# Patient Record
Sex: MALE | Race: WHITE | NOT HISPANIC OR LATINO | URBAN - METROPOLITAN AREA
[De-identification: names, ages, dates, MRNs, and addresses within clinical notes are randomized per-mention and may not be internally consistent; named-entity substitution may affect disease eponyms.]

---

## 2017-06-23 PROBLEM — Z00.00 ENCOUNTER FOR PREVENTIVE HEALTH EXAMINATION: Status: ACTIVE | Noted: 2017-06-23

## 2018-09-05 ENCOUNTER — EMERGENCY (EMERGENCY)
Facility: HOSPITAL | Age: 58
LOS: 1 days | Discharge: HOME | End: 2018-09-05
Attending: EMERGENCY MEDICINE
Payer: COMMERCIAL

## 2018-09-05 VITALS
OXYGEN SATURATION: 99 % | DIASTOLIC BLOOD PRESSURE: 65 MMHG | SYSTOLIC BLOOD PRESSURE: 110 MMHG | TEMPERATURE: 98 F | HEART RATE: 56 BPM | RESPIRATION RATE: 18 BRPM

## 2018-09-05 VITALS
WEIGHT: 164.91 LBS | HEIGHT: 66 IN | RESPIRATION RATE: 18 BRPM | HEART RATE: 74 BPM | DIASTOLIC BLOOD PRESSURE: 80 MMHG | SYSTOLIC BLOOD PRESSURE: 130 MMHG | TEMPERATURE: 98 F | OXYGEN SATURATION: 100 %

## 2018-09-05 LAB
ALBUMIN SERPL ELPH-MCNC: 3.9 G/DL — SIGNIFICANT CHANGE UP (ref 3.3–5.2)
ALP SERPL-CCNC: 83 U/L — SIGNIFICANT CHANGE UP (ref 40–120)
ALT FLD-CCNC: 63 U/L — HIGH
ANION GAP SERPL CALC-SCNC: 12 MMOL/L — SIGNIFICANT CHANGE UP (ref 5–17)
AST SERPL-CCNC: 89 U/L — HIGH
BASOPHILS # BLD AUTO: 0 K/UL — SIGNIFICANT CHANGE UP (ref 0–0.2)
BASOPHILS NFR BLD AUTO: 0.2 % — SIGNIFICANT CHANGE UP (ref 0–2)
BILIRUB SERPL-MCNC: 0.5 MG/DL — SIGNIFICANT CHANGE UP (ref 0.4–2)
BUN SERPL-MCNC: 19 MG/DL — SIGNIFICANT CHANGE UP (ref 8–20)
CALCIUM SERPL-MCNC: 9.2 MG/DL — SIGNIFICANT CHANGE UP (ref 8.6–10.2)
CHLORIDE SERPL-SCNC: 107 MMOL/L — SIGNIFICANT CHANGE UP (ref 98–107)
CK SERPL-CCNC: 124 U/L — SIGNIFICANT CHANGE UP (ref 30–200)
CO2 SERPL-SCNC: 25 MMOL/L — SIGNIFICANT CHANGE UP (ref 22–29)
CREAT SERPL-MCNC: 0.8 MG/DL — SIGNIFICANT CHANGE UP (ref 0.5–1.3)
EOSINOPHIL # BLD AUTO: 0.1 K/UL — SIGNIFICANT CHANGE UP (ref 0–0.5)
EOSINOPHIL NFR BLD AUTO: 1.5 % — SIGNIFICANT CHANGE UP (ref 0–5)
GLUCOSE SERPL-MCNC: 111 MG/DL — SIGNIFICANT CHANGE UP (ref 70–115)
HCT VFR BLD CALC: 37.8 % — LOW (ref 42–52)
HGB BLD-MCNC: 12.6 G/DL — LOW (ref 14–18)
LYMPHOCYTES # BLD AUTO: 1.2 K/UL — SIGNIFICANT CHANGE UP (ref 1–4.8)
LYMPHOCYTES # BLD AUTO: 24.2 % — SIGNIFICANT CHANGE UP (ref 20–55)
MCHC RBC-ENTMCNC: 30.1 PG — SIGNIFICANT CHANGE UP (ref 27–31)
MCHC RBC-ENTMCNC: 33.3 G/DL — SIGNIFICANT CHANGE UP (ref 32–36)
MCV RBC AUTO: 90.2 FL — SIGNIFICANT CHANGE UP (ref 80–94)
MONOCYTES # BLD AUTO: 0.5 K/UL — SIGNIFICANT CHANGE UP (ref 0–0.8)
MONOCYTES NFR BLD AUTO: 9.6 % — SIGNIFICANT CHANGE UP (ref 3–10)
NEUTROPHILS # BLD AUTO: 3.1 K/UL — SIGNIFICANT CHANGE UP (ref 1.8–8)
NEUTROPHILS NFR BLD AUTO: 64.3 % — SIGNIFICANT CHANGE UP (ref 37–73)
PLATELET # BLD AUTO: 161 K/UL — SIGNIFICANT CHANGE UP (ref 150–400)
POTASSIUM SERPL-MCNC: 4 MMOL/L — SIGNIFICANT CHANGE UP (ref 3.5–5.3)
POTASSIUM SERPL-SCNC: 4 MMOL/L — SIGNIFICANT CHANGE UP (ref 3.5–5.3)
PROT SERPL-MCNC: 6.7 G/DL — SIGNIFICANT CHANGE UP (ref 6.6–8.7)
RBC # BLD: 4.19 M/UL — LOW (ref 4.6–6.2)
RBC # FLD: 12.9 % — SIGNIFICANT CHANGE UP (ref 11–15.6)
SODIUM SERPL-SCNC: 144 MMOL/L — SIGNIFICANT CHANGE UP (ref 135–145)
TROPONIN T SERPL-MCNC: <0.01 NG/ML — SIGNIFICANT CHANGE UP (ref 0–0.06)
TROPONIN T SERPL-MCNC: <0.01 NG/ML — SIGNIFICANT CHANGE UP (ref 0–0.06)
WBC # BLD: 4.8 K/UL — SIGNIFICANT CHANGE UP (ref 4.8–10.8)
WBC # FLD AUTO: 4.8 K/UL — SIGNIFICANT CHANGE UP (ref 4.8–10.8)

## 2018-09-05 PROCEDURE — 93010 ELECTROCARDIOGRAM REPORT: CPT

## 2018-09-05 PROCEDURE — 80053 COMPREHEN METABOLIC PANEL: CPT

## 2018-09-05 PROCEDURE — 99283 EMERGENCY DEPT VISIT LOW MDM: CPT | Mod: 25

## 2018-09-05 PROCEDURE — 85027 COMPLETE CBC AUTOMATED: CPT

## 2018-09-05 PROCEDURE — 93005 ELECTROCARDIOGRAM TRACING: CPT

## 2018-09-05 PROCEDURE — 82550 ASSAY OF CK (CPK): CPT

## 2018-09-05 PROCEDURE — 71046 X-RAY EXAM CHEST 2 VIEWS: CPT | Mod: 26

## 2018-09-05 PROCEDURE — 84484 ASSAY OF TROPONIN QUANT: CPT

## 2018-09-05 PROCEDURE — 71046 X-RAY EXAM CHEST 2 VIEWS: CPT

## 2018-09-05 PROCEDURE — 36415 COLL VENOUS BLD VENIPUNCTURE: CPT

## 2018-09-05 PROCEDURE — 99285 EMERGENCY DEPT VISIT HI MDM: CPT | Mod: 25

## 2018-09-05 RX ORDER — ATORVASTATIN CALCIUM 80 MG/1
1 TABLET, FILM COATED ORAL
Qty: 0 | Refills: 0 | COMMUNITY

## 2018-09-05 RX ORDER — FAMOTIDINE 10 MG/ML
40 INJECTION INTRAVENOUS ONCE
Qty: 0 | Refills: 0 | Status: COMPLETED | OUTPATIENT
Start: 2018-09-05 | End: 2018-09-05

## 2018-09-05 RX ORDER — ASPIRIN/CALCIUM CARB/MAGNESIUM 324 MG
1 TABLET ORAL
Qty: 0 | Refills: 0 | COMMUNITY

## 2018-09-05 RX ORDER — SODIUM CHLORIDE 9 MG/ML
3 INJECTION INTRAMUSCULAR; INTRAVENOUS; SUBCUTANEOUS EVERY 8 HOURS
Qty: 0 | Refills: 0 | Status: DISCONTINUED | OUTPATIENT
Start: 2018-09-05 | End: 2018-09-10

## 2018-09-05 RX ADMIN — FAMOTIDINE 40 MILLIGRAM(S): 10 INJECTION INTRAVENOUS at 05:04

## 2018-09-05 NOTE — ED PROVIDER NOTE - ATTENDING CONTRIBUTION TO CARE
59 yo M with hx of high cholesterol presents to ED c/o being awakened from sleep by mid-sternal pressure with assoc belching.  No assoc SOB, N/v, diaphoresis or syncope.  Pt seen by Cardio in the past and has had neg stress tests.  On arrival pt denies any pain.  On exam pt awake and alert in NAD.  HEENT  throat clear mm moist, Neck supple, Cor Reg, Lungs clear b/l, Abd soft, NT, BS+, Ext no edema, Neuro non-focal.  EKG NSR with no acute changes, Will check labs, CE, CXR and re-eval

## 2018-09-05 NOTE — ED PROVIDER NOTE - PROGRESS NOTE DETAILS
Uday: evaluated pt. he is currently chest pain free. low risk heart score. However, I advised the pt that we should proceed with CTA cardiac to rule out cardiac etiology. He states he does not want it as he wishes rto go to work, and states his cardiologist Dr. sanchez perfomred a CT in September of last eyar with a "normal calcium score and normal vessels". He states he feels well, already has a f/u appt with cardio within the next week and declines Re-evalauted pt. Again he is well appearing and chest pain free. Second trop pending. A second time I advised and urged the pt to proceed with cardiac CT and he states "I have had so many scans I am going to light up". States he just wants to be discharged after second set of enzymes and f/u with his cardiologist. I informed him that it is still possible to have interval development of CAD and he is still at risk for MI and sudden death but she states he does not wish to do so and again wishes to leavbe. Second trop neg. pt remains chest pain free and wishes to f/u with laura. Wife is now here, and I offered and recommended CTA cardiac, she agrees that she would like him to be discharged and f/u with cardiology. Low risk heart score of 3, and as such, prudent for expedient cardio f/u. Will dc and not sign out ama

## 2018-09-05 NOTE — ED ADULT NURSE REASSESSMENT NOTE - NS ED NURSE REASSESS COMMENT FT1
patient aware of plan to repeat blood work about 8 am
Assumed pt care at this time. Pt A & OX4, respirations even & unlabored. Pt states he "feels better" pt waiting repeat Troponin dispo pending Trop results, pt aware of plan of care.

## 2018-09-05 NOTE — ED ADULT TRIAGE NOTE - CHIEF COMPLAINT QUOTE
c/o woke up for indigestion, ribs discomfort, denies any chest pain diaphoreses  sees a cardiologist every yr

## 2018-09-05 NOTE — ED ADULT NURSE NOTE - NSIMPLEMENTINTERV_GEN_ALL_ED
Implemented All Universal Safety Interventions:  [place] to call system. Call bell, personal items and telephone within reach. Instruct patient to call for assistance. Room bathroom lighting operational. Non-slip footwear when patient is off stretcher. Physically safe environment: no spills, clutter or unnecessary equipment. Stretcher in lowest position, wheels locked, appropriate side rails in place.

## 2018-09-05 NOTE — ED PROVIDER NOTE - OBJECTIVE STATEMENT
Pt is a 58y M with PMH of Hypercholesterolemia complaining of chest pain that woke him up from sleep Wife reports he was pacing due to the pain. Pt reports the pain as a pressure in "my whole chest cavity". HE reports having a lot of flatulence lately but has not changed his diet. He reports having normal BM's every day. He is followed by a cardiologist and has an appointment on 9/11. HE has a stress test yearly. He has a significant family cardiac history. He takes a baby aspirin daily. Pt reports pain subsided on arrival to ER. He denies any diaphoresis/discomfort/cough/fever/SOB/nausea/vomiting/pain. NKDA.  PCP is Dr. Rebolledo.

## 2018-09-05 NOTE — ED PROVIDER NOTE - CHPI ED SYMPTOMS NEG
no shortness of breath/no back pain/no cough/no fever/no chills/no dizziness/no nausea/no vomiting/no syncope/no diaphoresis

## 2019-02-28 PROBLEM — E78.00 PURE HYPERCHOLESTEROLEMIA, UNSPECIFIED: Chronic | Status: ACTIVE | Noted: 2018-09-05

## 2019-03-18 ENCOUNTER — RECORD ABSTRACTING (OUTPATIENT)
Age: 59
End: 2019-03-18

## 2019-03-18 DIAGNOSIS — Z82.3 FAMILY HISTORY OF STROKE: ICD-10-CM

## 2019-03-18 DIAGNOSIS — Z78.9 OTHER SPECIFIED HEALTH STATUS: ICD-10-CM

## 2019-03-18 DIAGNOSIS — K21.9 GASTRO-ESOPHAGEAL REFLUX DISEASE W/OUT ESOPHAGITIS: ICD-10-CM

## 2019-03-18 DIAGNOSIS — Z83.3 FAMILY HISTORY OF DIABETES MELLITUS: ICD-10-CM

## 2019-05-30 ENCOUNTER — APPOINTMENT (OUTPATIENT)
Dept: GASTROENTEROLOGY | Facility: CLINIC | Age: 59
End: 2019-05-30
Payer: COMMERCIAL

## 2019-05-30 VITALS
HEIGHT: 67 IN | BODY MASS INDEX: 26.53 KG/M2 | DIASTOLIC BLOOD PRESSURE: 70 MMHG | SYSTOLIC BLOOD PRESSURE: 110 MMHG | HEART RATE: 74 BPM | WEIGHT: 169 LBS

## 2019-05-30 PROCEDURE — 99203 OFFICE O/P NEW LOW 30 MIN: CPT

## 2019-05-30 RX ORDER — ASPIRIN 81 MG
81 TABLET, DELAYED RELEASE (ENTERIC COATED) ORAL
Refills: 0 | Status: DISCONTINUED | COMMUNITY

## 2019-05-31 NOTE — ASSESSMENT
[FreeTextEntry1] : Positive family history of 2 first-degree relatives, brothers with colon polyps, therefore, patient is at increased risk for development of colorectal neoplasia. As such a screening colonoscopy is indicated and was offered to the patient. The procedure, its risks, benefits, and prepped, were explained to the patient, who understands and is agreeable to proceed. ASA #2. Suprep will be utilized. Aspirin will be held for one week prior to the procedure. Recent blood work from PCP will be reviewed. No additional blood work is anticipated. Patient is in optimal medical condition to undergo planned procedure. Results to follow. Arrangements made.

## 2019-05-31 NOTE — HISTORY OF PRESENT ILLNESS
[FreeTextEntry1] : 58-year-old white male with history of hyperlipidemia spinal stenosis kidney stones, who has had a negative screening colonoscopy in 1/2010 by .\par Patient now reports that his family history is remarkable for 2 brothers who have had colon polyps. A maternal grandfather had stomach cancer, but no family members with colon cancer. Patient presents for evaluation of a screening colonoscopy. He reports having a daily bowel movement. No abdominal pain, weight loss, alteration in pattern of bowel movements or rectal bleeding. No history of hemorrhoids. No anemia. Recent blood work was performed by Dr. Rodríguez, primary care physician. Currently, not available. Will be reviewed.\par On the upper GI symptom is occasional heartburn, responsive to use of TUMS. No new medical issues. No history of cardiac or pulmonary disease.

## 2019-05-31 NOTE — PHYSICAL EXAM
[General Appearance - Alert] : alert [General Appearance - In No Acute Distress] : in no acute distress [Sclera] : the sclera and conjunctiva were normal [PERRL With Normal Accommodation] : pupils were equal in size, round, and reactive to light [Extraocular Movements] : extraocular movements were intact [Outer Ear] : the ears and nose were normal in appearance [Oropharynx] : the oropharynx was normal [Neck Appearance] : the appearance of the neck was normal [Neck Cervical Mass (___cm)] : no neck mass was observed [Jugular Venous Distention Increased] : there was no jugular-venous distention [Thyroid Diffuse Enlargement] : the thyroid was not enlarged [Thyroid Nodule] : there were no palpable thyroid nodules [Auscultation Breath Sounds / Voice Sounds] : lungs were clear to auscultation bilaterally [Heart Rate And Rhythm] : heart rate was normal and rhythm regular [Heart Sounds] : normal S1 and S2 [Heart Sounds Gallop] : no gallops [Murmurs] : no murmurs [Heart Sounds Pericardial Friction Rub] : no pericardial rub [Bowel Sounds] : normal bowel sounds [Abdomen Soft] : soft [Abdomen Tenderness] : non-tender [] : no hepato-splenomegaly [Abdomen Mass (___ Cm)] : no abdominal mass palpated [FreeTextEntry1] : no scars. No adenopathy. No hernias are

## 2019-05-31 NOTE — CONSULT LETTER
[Dear  ___] : Dear  [unfilled], [Consult Letter:] : I had the pleasure of evaluating your patient, [unfilled]. [Please see my note below.] : Please see my note below. [Consult Closing:] : Thank you very much for allowing me to participate in the care of this patient.  If you have any questions, please do not hesitate to contact me. [Sincerely,] : Sincerely, [FreeTextEntry1] : Prior negative colonoscopy 9.5 years ago. That was positive family history of 2 first-degree relatives with colon polyps, brothers. Screening colonoscopy arranged. [FreeTextEntry3] : Mike Arthur MD FACG\par Diplomate American Board of Internal Medicine and Gastroenterolgy\par [facility]\par

## 2019-06-03 ENCOUNTER — RECORD ABSTRACTING (OUTPATIENT)
Age: 59
End: 2019-06-03

## 2019-06-03 ENCOUNTER — APPOINTMENT (OUTPATIENT)
Dept: CARDIOLOGY | Facility: CLINIC | Age: 59
End: 2019-06-03
Payer: COMMERCIAL

## 2019-06-03 ENCOUNTER — NON-APPOINTMENT (OUTPATIENT)
Age: 59
End: 2019-06-03

## 2019-06-03 VITALS
HEIGHT: 67 IN | DIASTOLIC BLOOD PRESSURE: 75 MMHG | BODY MASS INDEX: 26.68 KG/M2 | RESPIRATION RATE: 16 BRPM | HEART RATE: 60 BPM | OXYGEN SATURATION: 97 % | WEIGHT: 170 LBS | SYSTOLIC BLOOD PRESSURE: 120 MMHG

## 2019-06-03 DIAGNOSIS — Z87.442 PERSONAL HISTORY OF URINARY CALCULI: ICD-10-CM

## 2019-06-03 DIAGNOSIS — Z87.39 PERSONAL HISTORY OF OTHER DISEASES OF THE MUSCULOSKELETAL SYSTEM AND CONNECTIVE TISSUE: ICD-10-CM

## 2019-06-03 DIAGNOSIS — Z82.49 FAMILY HISTORY OF ISCHEMIC HEART DISEASE AND OTHER DISEASES OF THE CIRCULATORY SYSTEM: ICD-10-CM

## 2019-06-03 DIAGNOSIS — R55 SYNCOPE AND COLLAPSE: ICD-10-CM

## 2019-06-03 DIAGNOSIS — Z80.52 FAMILY HISTORY OF MALIGNANT NEOPLASM OF BLADDER: ICD-10-CM

## 2019-06-03 DIAGNOSIS — Z86.39 PERSONAL HISTORY OF OTHER ENDOCRINE, NUTRITIONAL AND METABOLIC DISEASE: ICD-10-CM

## 2019-06-03 DIAGNOSIS — Z87.898 PERSONAL HISTORY OF OTHER SPECIFIED CONDITIONS: ICD-10-CM

## 2019-06-03 DIAGNOSIS — Z12.11 ENCOUNTER FOR SCREENING FOR MALIGNANT NEOPLASM OF COLON: ICD-10-CM

## 2019-06-03 PROCEDURE — 93000 ELECTROCARDIOGRAM COMPLETE: CPT

## 2019-06-03 PROCEDURE — 99204 OFFICE O/P NEW MOD 45 MIN: CPT

## 2019-06-03 RX ORDER — TADALAFIL 20 MG/1
20 TABLET, FILM COATED ORAL
Refills: 0 | Status: ACTIVE | COMMUNITY

## 2019-06-03 RX ORDER — CHROMIUM 200 MCG
TABLET ORAL
Refills: 0 | Status: ACTIVE | COMMUNITY

## 2019-06-03 RX ORDER — ASPIRIN 81 MG
81 TABLET, DELAYED RELEASE (ENTERIC COATED) ORAL DAILY
Refills: 0 | Status: ACTIVE | COMMUNITY

## 2019-06-03 NOTE — HISTORY OF PRESENT ILLNESS
[FreeTextEntry1] : He is active but does not exercise regularly.\par \par Patient denies any chest pain, shortness of breath, palpitations, PND, orthopnea or edema\par \par Follows a reasonably good diet and has no other active medical issues at this time.

## 2019-06-03 NOTE — REASON FOR VISIT
[FreeTextEntry1] : This is a 58 year old male presenting for cardiac evaluation and transferring care from Dr. Cristofer Berger who has now retired. His cardiac history includes;\par \par 1. CAD based on Abnl Ca++ scoring\par 2. High Cholesterol\par 3. History of chest pain \par 4. Extensive familial cardiac history of HTN, stent placement, CVA and CABG.\par \par He was seen and released  from [facility]  in 2018 for an acute new onset of chest  pain   and recommended to have a  Nuclear stress test which he never had completed.  Since his initial chest pain occurrence he since as been asymptomatic.

## 2019-06-03 NOTE — ASSESSMENT
[FreeTextEntry1] : ECG: Sinus bradycardia 50 beats a minute. Normal axis and intervals. No significant ST or T wave changes.\par \par Laboratory data 5/19/19:\par Cholesterol 137\par HDL 50\par LDL 72\par A1c 5.6\par Electrolytes and LFTs normal\par \par Impression:\par 58-year-old male with risk factors of hyperlipidemia and a strong family history of coronary artery disease with an abnormal calcium score.\par \par 1 hyperlipidemia seems adequately controlled.\par \par 2. Absence of a regular exercise program but no exercise related symptoms\par \par 3. Unexplained chest pain syndrome resulting in a hospital visit a few months ago and an uncompleted stress test.\par \par Plan:\par 1. Schedule regular exercise stress test.\par \par 2. Begin regular symptom limited exercise regimen\par \par 3. For all future blood work here for review\par \par 4. Followup in 6 months or p.r.n.

## 2019-07-19 ENCOUNTER — RX CHANGE (OUTPATIENT)
Age: 59
End: 2019-07-19

## 2019-07-19 ENCOUNTER — TRANSCRIPTION ENCOUNTER (OUTPATIENT)
Age: 59
End: 2019-07-19

## 2019-07-19 ENCOUNTER — APPOINTMENT (OUTPATIENT)
Dept: CARDIOLOGY | Facility: CLINIC | Age: 59
End: 2019-07-19
Payer: COMMERCIAL

## 2019-07-19 PROCEDURE — 93015 CV STRESS TEST SUPVJ I&R: CPT

## 2019-08-16 ENCOUNTER — APPOINTMENT (OUTPATIENT)
Dept: CARDIOLOGY | Facility: CLINIC | Age: 59
End: 2019-08-16
Payer: COMMERCIAL

## 2019-08-16 PROCEDURE — A9500: CPT

## 2019-08-16 PROCEDURE — 93015 CV STRESS TEST SUPVJ I&R: CPT

## 2019-08-16 PROCEDURE — 78452 HT MUSCLE IMAGE SPECT MULT: CPT

## 2019-09-05 RX ORDER — KIT FOR THE PREPARATION OF TECHNETIUM TC99M SESTAMIBI 1 MG/5ML
INJECTION, POWDER, LYOPHILIZED, FOR SOLUTION PARENTERAL
Refills: 0 | Status: COMPLETED | OUTPATIENT
Start: 2019-09-05

## 2019-09-05 RX ADMIN — KIT FOR THE PREPARATION OF TECHNETIUM TC99M SESTAMIBI 0: 1 INJECTION, POWDER, LYOPHILIZED, FOR SOLUTION PARENTERAL at 00:00

## 2019-09-23 ENCOUNTER — OUTPATIENT (OUTPATIENT)
Dept: OUTPATIENT SERVICES | Facility: HOSPITAL | Age: 59
LOS: 1 days | End: 2019-09-23
Payer: COMMERCIAL

## 2019-09-23 ENCOUNTER — APPOINTMENT (OUTPATIENT)
Dept: GASTROENTEROLOGY | Facility: GI CENTER | Age: 59
End: 2019-09-23
Payer: COMMERCIAL

## 2019-09-23 DIAGNOSIS — Z12.11 ENCOUNTER FOR SCREENING FOR MALIGNANT NEOPLASM OF COLON: ICD-10-CM

## 2019-09-23 PROCEDURE — 45378 DIAGNOSTIC COLONOSCOPY: CPT

## 2019-09-23 PROCEDURE — G0105: CPT

## 2019-09-23 NOTE — PROCEDURE
[Colon Cancer Screening] : colon cancer screening [Procedure Explained] : The procedure was explained [Allergies Reviewed] : allergies reviewed. [Risks] : Risks [Benefits] : benefits [Alternatives] : alternatives [Consent Obtained] : written consent was obtained prior to the procedure and is detailed in the patient's record [Patient] : the patient [Bowel Prep Kit] : the patient took the appropriate bowel preparation kit as directed [Approved Diet Followed] : the patient avoided solid foods and adhered to the approved diet list for 24 hours prior to the procedure [Automated Blood Pressure Cuff] : automated blood pressure cuff [Cardiac Monitor] : cardiac monitor [Pulse Oximeter] : pulse oximeter [Propofol ___ mg IV] : Propofol [unfilled] ~Umg intravenously [2] : 2 [Prep Qualtiy: ___] : Prep Quality:  [unfilled] [Withdrawal Time: ___] : Withdrawal Time:  [unfilled] [Left Lateral Decubitus] : The patient was positioned in the left lateral decubitus position [Abnormal Rectum] : a normal rectum [External Hemorrhoids] : no external hemorrhoids [Normal Prostate] : a normal prostate [Terminal Ileum via Ileocecal Valve] : and the terminal ileum was examined by entering the ileocecal valve [Minimal Difficulty] : with minimal difficulty [Insufflated] : insufflated [Multiple Passes Needed] : after multiple passes [Retroflex View] : a retroflex view of the rectum was performed [Normal] : Normal [Hemorrhoids] : hemorrhoids [Tolerated Well] : the patient tolerated the procedure well [Vital Signs Stable] : the vital signs were stable [No Complications] : There were no complications [de-identified] : [id number] [de-identified] : TI / ICV were normal.   [de-identified] : Moderate Grade 2 internal hemorrhoids; inactive.   [de-identified] : Negative for Neoplasia;  + Internal hemorrhoids.

## 2019-09-23 NOTE — ASSESSMENT
[FreeTextEntry1] : Screening colonoscopy is negative for CR neoplasia.  Avg risk; repeat screening colonoscopy in 10 yrs.  \par \par Internal hemorrhoids:  High fiber diet.  Topicals prn.

## 2019-09-23 NOTE — REASON FOR VISIT
[Follow-Up: _____] : a [unfilled] follow-up visit [Colonoscopy] : a colonoscopy [FreeTextEntry2] : screening

## 2019-09-23 NOTE — HISTORY OF PRESENT ILLNESS
[FreeTextEntry1] : 59 yr old WM c HLD.  neg FH for CRCa.  All prior BW was normal in 5/2019.  No new medical issues.

## 2019-09-23 NOTE — PROCEDURE
[Colon Cancer Screening] : colon cancer screening [Procedure Explained] : The procedure was explained [Allergies Reviewed] : allergies reviewed. [Risks] : Risks [Benefits] : benefits [Alternatives] : alternatives [Consent Obtained] : written consent was obtained prior to the procedure and is detailed in the patient's record [Patient] : the patient [Bowel Prep Kit] : the patient took the appropriate bowel preparation kit as directed [Approved Diet Followed] : the patient avoided solid foods and adhered to the approved diet list for 24 hours prior to the procedure [Automated Blood Pressure Cuff] : automated blood pressure cuff [Cardiac Monitor] : cardiac monitor [Pulse Oximeter] : pulse oximeter [Propofol ___ mg IV] : Propofol [unfilled] ~Umg intravenously [2] : 2 [Prep Qualtiy: ___] : Prep Quality:  [unfilled] [Withdrawal Time: ___] : Withdrawal Time:  [unfilled] [Left Lateral Decubitus] : The patient was positioned in the left lateral decubitus position [Abnormal Rectum] : a normal rectum [External Hemorrhoids] : no external hemorrhoids [Normal Prostate] : a normal prostate [Terminal Ileum via Ileocecal Valve] : and the terminal ileum was examined by entering the ileocecal valve [Minimal Difficulty] : with minimal difficulty [Insufflated] : insufflated [Multiple Passes Needed] : after multiple passes [Retroflex View] : a retroflex view of the rectum was performed [Normal] : Normal [Hemorrhoids] : hemorrhoids [Tolerated Well] : the patient tolerated the procedure well [Vital Signs Stable] : the vital signs were stable [No Complications] : There were no complications [de-identified] : [id number] [de-identified] : TI / ICV were normal.   [de-identified] : Negative for Neoplasia;  + Internal hemorrhoids.   [de-identified] : Moderate Grade 2 internal hemorrhoids; inactive.

## 2019-10-28 ENCOUNTER — NON-APPOINTMENT (OUTPATIENT)
Age: 59
End: 2019-10-28

## 2019-10-28 ENCOUNTER — APPOINTMENT (OUTPATIENT)
Dept: CARDIOLOGY | Facility: CLINIC | Age: 59
End: 2019-10-28
Payer: COMMERCIAL

## 2019-10-28 VITALS
BODY MASS INDEX: 26.53 KG/M2 | WEIGHT: 169 LBS | SYSTOLIC BLOOD PRESSURE: 110 MMHG | DIASTOLIC BLOOD PRESSURE: 72 MMHG | RESPIRATION RATE: 16 BRPM | HEART RATE: 64 BPM | OXYGEN SATURATION: 99 % | HEIGHT: 67 IN

## 2019-10-28 DIAGNOSIS — Z12.11 ENCOUNTER FOR SCREENING FOR MALIGNANT NEOPLASM OF COLON: ICD-10-CM

## 2019-10-28 PROCEDURE — 99214 OFFICE O/P EST MOD 30 MIN: CPT

## 2019-10-28 PROCEDURE — 93000 ELECTROCARDIOGRAM COMPLETE: CPT

## 2019-10-28 NOTE — HISTORY OF PRESENT ILLNESS
[FreeTextEntry1] : He continues to be physically active but does not exercise regularly.\par \par Patient denies any chest pain, shortness of breath, palpitations, PND, orthopnea or edema\par \par Follows a reasonably good diet and has no other active medical issues at this time.

## 2019-10-28 NOTE — ASSESSMENT
[FreeTextEntry1] : ECG: Sinus rhythm  64  beats a minute, DRWP.\par \par 7/19/19 EST\par Exercised for 12 minutes 0 seconds achieving 13 METS\par Peak  bpm ( 94 %)\par Peak /80\par 1.0 mm horizontal ST segment depression, positive for ischemia\par \par \par 8/16/19 Nuclear stress test\par Peak  BPM\par Peak BP   166/76\par Dev. rare PVCs and PACs\par 2.0 mm horizontal segment depression\par SPECT imaging negative for ischemia\par \par \par Laboratory data 5/19/19:\par Chol  137\par HDL   50\par LDL   72\par A1c 5.6\par Electrolytes and LFTs normal\par \par Impression:\par 59-year-old male with risk factors of hyperlipidemia and a strong family history of coronary artery disease with an abnormal calcium score.\par \par 1 Hyperlipidemia seems adequately controlled and tolerating Lipitor 40 mg.\par \par 2. Absence of a regular exercise program, walks daily with no exertional discomfort and good exercise tolerance\par \par 3 .Nuclear stress test SPECT imaging negative for ischemia with likely false + Stress ECG\par \par 4. Blood pressure today 110/72 and controlled.\par \par Mr. Sorto has been extensively educated on the all results of of his cardiac testing and reassured at this time in the absence of any cardiac associated symptoms, excellent exercise tolerance and that the abnormal  findings on his Stress ECG are likely a false positive.\par \par Plan:\par 1. Moving forward future stress test will be ordered as a nuclear study. This can be repeated in 2 years in the absence of any cardiac associated symptoms.\par \par 2. Increase exercise regimen and continued diet modifications is encouraged.\par \par 3. Continue to F/U with PCP as scheduled and have all blood work faxed to our office. \par \par 4. Continue statin therapy\par \par \par Clinical follow up in  6 months

## 2019-10-28 NOTE — REASON FOR VISIT
[FreeTextEntry1] : This is a 59 year old male presenting for cardiac evaluation\par His cardiac history includes;\par \par 1. CAD based on Abnl Ca++ scoring\par 2. High Cholesterol\par 3. History of chest pain \par 4. Extensive family hx of premature CAD;  history of HTN, \par \par He was seen in [facility]  in 2018 for an acute new onset of chest  pain   and was recommended to have had a  Nuclear stress test which he never completed.\par \par Here to review the results of an abnl regular  EST and a nuclear stress test in July 2019 \par  1.0 mm horizontal ST segment depression.seen on initial EST  and this was seen on his nuclear stress test ECG but SPECT imaging was negative for any evidence  of coronary disease.\par \par  negative Soft, non-tender, no hepatosplenomegaly, normal bowel sounds

## 2019-12-03 NOTE — ED PROVIDER NOTE - NS_HEARTSCECG_ED_A_ED
Alert and oriented to person, place and time, memory intact, behavior appropriate to situation, PERRL.
Normal

## 2020-05-29 ENCOUNTER — APPOINTMENT (OUTPATIENT)
Dept: PULMONOLOGY | Facility: CLINIC | Age: 60
End: 2020-05-29
Payer: COMMERCIAL

## 2020-05-29 VITALS
BODY MASS INDEX: 27 KG/M2 | DIASTOLIC BLOOD PRESSURE: 78 MMHG | HEART RATE: 93 BPM | SYSTOLIC BLOOD PRESSURE: 122 MMHG | OXYGEN SATURATION: 98 % | HEIGHT: 66 IN | RESPIRATION RATE: 16 BRPM | WEIGHT: 168 LBS

## 2020-05-29 PROCEDURE — 99204 OFFICE O/P NEW MOD 45 MIN: CPT

## 2020-05-29 RX ORDER — SODIUM SULFATE, POTASSIUM SULFATE, MAGNESIUM SULFATE 17.5; 3.13; 1.6 G/ML; G/ML; G/ML
17.5-3.13-1.6 SOLUTION, CONCENTRATE ORAL
Qty: 1 | Refills: 0 | Status: DISCONTINUED | COMMUNITY
Start: 2019-05-30 | End: 2020-05-29

## 2020-05-29 NOTE — HISTORY OF PRESENT ILLNESS
[TextBox_4] : The patient reports going to [facility] for cough and fevers in mid March and CXR revealed multiple R sided nodules.\par He now is without respiratory complaints.\par He admits to PNDS.\par He reports improvement with Claritin likely related to allergies.\par

## 2020-05-29 NOTE — CONSULT LETTER
[Dear  ___] : Dear  [unfilled], [Consult Letter:] : I had the pleasure of evaluating your patient, [unfilled]. [Please see my note below.] : Please see my note below. [Consult Closing:] : Thank you very much for allowing me to participate in the care of this patient.  If you have any questions, please do not hesitate to contact me. [FreeTextEntry3] : Jayce Purvis MD, FCCP, D. ABSM\par Pulmonary and Sleep Medicine\par [facility] [place] [Sincerely,] : Sincerely,

## 2020-05-29 NOTE — REASON FOR VISIT
[Initial] : an initial visit [Abnormal CXR/ Chest CT] : an abnormal CXR/ chest CT [Pulmonary Nodules] : pulmonary nodules

## 2020-05-29 NOTE — DISCUSSION/SUMMARY
[FreeTextEntry1] : \par #1. Will schedule PFTs in near future to assess lung function if desired by pt\par #2. Covid testing prior to PFTs\par #3. The patient does not appear to require chronic BD therapy at this time\par #4. Chest CT to re-evaluate nodules though likely stable going back to 2010\par #5. Flonase nasal spray for post nasal drip as needed\par #6. F/u after CT \par \par Discussed above with patient and wife who was also present.

## 2020-06-15 ENCOUNTER — APPOINTMENT (OUTPATIENT)
Dept: PULMONOLOGY | Facility: CLINIC | Age: 60
End: 2020-06-15
Payer: COMMERCIAL

## 2020-06-15 VITALS
BODY MASS INDEX: 27.32 KG/M2 | DIASTOLIC BLOOD PRESSURE: 64 MMHG | SYSTOLIC BLOOD PRESSURE: 110 MMHG | HEIGHT: 66 IN | WEIGHT: 170 LBS | OXYGEN SATURATION: 100 % | HEART RATE: 75 BPM

## 2020-06-15 DIAGNOSIS — R91.8 OTHER NONSPECIFIC ABNORMAL FINDING OF LUNG FIELD: ICD-10-CM

## 2020-06-15 DIAGNOSIS — R05 COUGH: ICD-10-CM

## 2020-06-15 DIAGNOSIS — R09.82 POSTNASAL DRIP: ICD-10-CM

## 2020-06-15 PROCEDURE — 99214 OFFICE O/P EST MOD 30 MIN: CPT

## 2020-06-15 NOTE — DISCUSSION/SUMMARY
[FreeTextEntry1] : \par #1. Will schedule PFTs in near future to assess lung function if desired by pt\par #2. Covid testing prior to PFTs\par #3. The patient does not appear to require chronic BD therapy at this time\par #4. Chest CT revealed stable nodules going back to at least 2016\par #5. Flonase nasal spray for post nasal drip as needed\par #6. F/u for PFTs if desired by pt\par \par Discussed above with patient and wife who was also present.

## 2020-06-15 NOTE — CONSULT LETTER
[Consult Letter:] : I had the pleasure of evaluating your patient, [unfilled]. [Dear  ___] : Dear  [unfilled], [Sincerely,] : Sincerely, [Please see my note below.] : Please see my note below. [Consult Closing:] : Thank you very much for allowing me to participate in the care of this patient.  If you have any questions, please do not hesitate to contact me. [FreeTextEntry3] : Jayce Purvis MD, FCCP, D. ABSM\par Pulmonary and Sleep Medicine\par [facility] at [place]

## 2020-06-15 NOTE — HISTORY OF PRESENT ILLNESS
[TextBox_4] : The patient reports going to [facility] for cough and fevers in mid March and CXR revealed multiple R sided nodules.\par He now is without respiratory complaints.\par He admits to PNDS.\par He reports improvement with Claritin likely related to allergies.\par

## 2020-06-15 NOTE — REASON FOR VISIT
[Follow-Up] : a follow-up visit [Abnormal CXR/ Chest CT] : an abnormal CXR/ chest CT [Pulmonary Nodules] : pulmonary nodules [Spouse] : spouse

## 2020-06-30 LAB
ALBUMIN SERPL ELPH-MCNC: 4.4 G/DL
ALP BLD-CCNC: 71 U/L
ALT SERPL-CCNC: 37 U/L
ANION GAP SERPL CALC-SCNC: 10 MMOL/L
AST SERPL-CCNC: 32 U/L
BILIRUB SERPL-MCNC: 0.6 MG/DL
BUN SERPL-MCNC: 21 MG/DL
CALCIUM SERPL-MCNC: 9.6 MG/DL
CHLORIDE SERPL-SCNC: 105 MMOL/L
CHOLEST SERPL-MCNC: 135 MG/DL
CHOLEST/HDLC SERPL: 2.6 RATIO
CO2 SERPL-SCNC: 26 MMOL/L
CREAT SERPL-MCNC: 0.95 MG/DL
GLUCOSE SERPL-MCNC: 107 MG/DL
HDLC SERPL-MCNC: 51 MG/DL
LDLC SERPL CALC-MCNC: 71 MG/DL
POTASSIUM SERPL-SCNC: 4.4 MMOL/L
PROT SERPL-MCNC: 6.7 G/DL
SODIUM SERPL-SCNC: 141 MMOL/L
TRIGL SERPL-MCNC: 61 MG/DL

## 2020-07-16 RX ORDER — CETIRIZINE HYDROCHLORIDE, PSEUDOEPHEDRINE HYDROCHLORIDE 5; 120 MG/1; MG/1
5-120 TABLET, FILM COATED, EXTENDED RELEASE ORAL
Qty: 15 | Refills: 0 | Status: DISCONTINUED | COMMUNITY
Start: 2020-03-30 | End: 2020-07-16

## 2020-07-16 RX ORDER — BENZONATATE 200 MG/1
200 CAPSULE ORAL
Qty: 20 | Refills: 0 | Status: DISCONTINUED | COMMUNITY
Start: 2020-03-30 | End: 2020-07-16

## 2020-07-17 ENCOUNTER — APPOINTMENT (OUTPATIENT)
Dept: CARDIOLOGY | Facility: CLINIC | Age: 60
End: 2020-07-17
Payer: COMMERCIAL

## 2020-07-17 ENCOUNTER — NON-APPOINTMENT (OUTPATIENT)
Age: 60
End: 2020-07-17

## 2020-07-17 VITALS
TEMPERATURE: 97.8 F | DIASTOLIC BLOOD PRESSURE: 62 MMHG | SYSTOLIC BLOOD PRESSURE: 120 MMHG | WEIGHT: 171 LBS | HEIGHT: 66 IN | BODY MASS INDEX: 27.48 KG/M2 | OXYGEN SATURATION: 98 % | RESPIRATION RATE: 16 BRPM | HEART RATE: 63 BPM

## 2020-07-17 DIAGNOSIS — R93.89 ABNORMAL FINDINGS ON DIAGNOSTIC IMAGING OF OTHER SPECIFIED BODY STRUCTURES: ICD-10-CM

## 2020-07-17 PROCEDURE — 99214 OFFICE O/P EST MOD 30 MIN: CPT

## 2020-07-17 PROCEDURE — 93000 ELECTROCARDIOGRAM COMPLETE: CPT

## 2020-07-17 NOTE — ASSESSMENT
[FreeTextEntry1] : ECG: Sinus rhythm  63   beats a minute, DRWP.\par \par 19 EST\par Exercised for 12 minutes 0 seconds achieving 13 METS\par Peak  bpm ( 94 %)\par Peak /80\par 1.0 mm horizontal ST segment depression, positive for ischemia\par \par 19 Nuclear stress test\par Peak  BPM\par Peak BP   166/76\par Dev. rare PVCs and PACs\par 2.0 mm horizontal segment depression\par SPECT imaging negative for ischemia\par \par Lab Data 2020\par Chol: 135\par HDL:   51\par LDL:   71\par Tr\par Creat: 0.95\par \par \par Laboratory data 19:\par Chol  137\par HDL   50\par LDL   72\par A1c 5.6\par Electrolytes and LFTs normal\par \par Impression:\par 59-year-old male with risk factors of hyperlipidemia and a strong family history of coronary artery disease with an abnormal calcium score.\par \par 1 Hyperlipidemia seems adequately controlled and tolerating Lipitor 40 mg.\par \par 2. Absence of a regular exercise program, but no exertional discomfort and good exercise tolerance on EST\par \par 3 .Nuclear stress test SPECT imaging negative for ischemia with likely false + Stress ECG\par \par 4. Blood pressure has been controlled.\par \par Mr. Sorto has been extensively educated on the all results of of his cardiac testing and reassured at this time in the absence of any cardiac associated symptoms, excellent exercise tolerance and that the abnormal  findings on his Stress ECG are likely a false positive.\par \par Plan:\par 1. Moving forward future stress test will be ordered as a nuclear study. This can be repeated  2021 in absence of any cardiac associated symptoms.\par \par 2. Increase exercise regimen and continued diet modifications is encouraged.\par \par 3. Continue to F/U with PCP as scheduled and have all blood work faxed to our office. \par \par 4. Continue statin therapy\par \par \par Clinical follow up in  6 months

## 2020-07-17 NOTE — REASON FOR VISIT
[FreeTextEntry1] : This is a 59 year old male presenting for cardiac evaluation\par His cardiac history includes;\par \par 1. CAD based on Abnl Ca++ scoring\par 2. High Cholesterol\par 3. History of chest pain \par 4. Extensive family hx of premature CAD;  history of HTN, \par \par  [facility]  in 2018 w/ chest  pain.\par \par had  an abnl regular  EST and a nuclear stress test in July 2019 \par  1.0 mm horizontal ST segment depression.seen on initial EST  and this was seen on his nuclear stress test ECG but SPECT imaging was negative for any evidence  of coronary disease.\par \par Continues to feel well. No recurrence of chest pain.\par Does not exercise.

## 2020-07-30 ENCOUNTER — APPOINTMENT (OUTPATIENT)
Dept: PULMONOLOGY | Facility: CLINIC | Age: 60
End: 2020-07-30

## 2021-01-11 LAB
ALBUMIN SERPL ELPH-MCNC: 4.2 G/DL
ALP BLD-CCNC: 95 U/L
ALT SERPL-CCNC: 32 U/L
ANION GAP SERPL CALC-SCNC: 12 MMOL/L
AST SERPL-CCNC: 32 U/L
BILIRUB SERPL-MCNC: 0.8 MG/DL
BUN SERPL-MCNC: 18 MG/DL
CALCIUM SERPL-MCNC: 9.7 MG/DL
CHLORIDE SERPL-SCNC: 104 MMOL/L
CHOLEST SERPL-MCNC: 115 MG/DL
CO2 SERPL-SCNC: 24 MMOL/L
CREAT SERPL-MCNC: 1 MG/DL
GLUCOSE SERPL-MCNC: 99 MG/DL
HDLC SERPL-MCNC: 48 MG/DL
LDLC SERPL CALC-MCNC: 56 MG/DL
NONHDLC SERPL-MCNC: 67 MG/DL
POTASSIUM SERPL-SCNC: 4.5 MMOL/L
PROT SERPL-MCNC: 7.1 G/DL
SODIUM SERPL-SCNC: 141 MMOL/L
TRIGL SERPL-MCNC: 54 MG/DL

## 2021-01-18 ENCOUNTER — NON-APPOINTMENT (OUTPATIENT)
Age: 61
End: 2021-01-18

## 2021-01-18 ENCOUNTER — APPOINTMENT (OUTPATIENT)
Dept: CARDIOLOGY | Facility: CLINIC | Age: 61
End: 2021-01-18
Payer: COMMERCIAL

## 2021-01-18 VITALS
SYSTOLIC BLOOD PRESSURE: 118 MMHG | HEART RATE: 62 BPM | RESPIRATION RATE: 16 BRPM | HEIGHT: 66 IN | BODY MASS INDEX: 27.48 KG/M2 | WEIGHT: 171 LBS | OXYGEN SATURATION: 98 % | DIASTOLIC BLOOD PRESSURE: 70 MMHG | TEMPERATURE: 97.3 F

## 2021-01-18 PROCEDURE — 99214 OFFICE O/P EST MOD 30 MIN: CPT

## 2021-01-18 PROCEDURE — 93000 ELECTROCARDIOGRAM COMPLETE: CPT

## 2021-01-18 PROCEDURE — 99072 ADDL SUPL MATRL&STAF TM PHE: CPT

## 2021-01-18 NOTE — REVIEW OF SYSTEMS
[Recent Weight Gain (___ Lbs)] : no recent weight gain [Recent Weight Loss (___ Lbs)] : no recent weight loss [FreeTextEntry1] : Other than as documented here and in the HPI, the thirteen point ROS is negative

## 2021-01-18 NOTE — ASSESSMENT
[FreeTextEntry1] : ECG: Sinus rhythm  62 bpm and unremarkable. \par \par Lab data 1/18/21\par Chol. 115\par LDL   56\par HDL   48\par Creat. 1.00\par \par 7/19/19 EST\par Exercised for 12 minutes 0 seconds achieving 13 METS\par Peak  bpm ( 94 %)\par Peak /80\par 1.0 mm horizontal ST segment depression, positive for ischemia\par \par 8/16/19 Nuclear stress test\par Peak  BPM\par Peak BP   166/76\par Dev. rare PVCs and PACs\par 2.0 mm horizontal segment depression\par SPECT imaging negative for ischemia\par \par \par Impression:\par 60-year-old male with risk factors of hyperlipidemia and a strong family history of coronary artery disease and a  abnormal calcium score from 2016.\par \par 1 Hyperlipidemia continues to be  adequately controlled and tolerating Lipitor 40 mg.\par \par 2. No active cardiac symptoms at rest or with exertion.\par \par 3 2018 Nuclear stress test SPECT imaging negative for ischemia with likely false + Stress ECG\par \par 4.Blood pressure continues to be controlled. \par \par 5. Very soft (L)carotid bruit  on exam today.\par \par 6. ECG today unchanged and WNL. \par \par \par Plan:\par 1. Moving forward future stress test will be ordered as a nuclear study, given the abnormal ST response seen on prior stress ECGs.  This can be repeated in the summer of  2021 in the absence of any cardiac associated symptoms which then this will be scheduled earlier.\par \par 2. Increase exercise regimen and continued diet modifications is encouraged.\par \par 3. Continue to F/U with PCP as scheduled and have all blood work faxed to our office. \par \par 4. Continue statin therapy.\par \par 5. Reassurance has been given. \par \par 6. Schedule Carotid US to assess bruit\par \par \par Clinical follow up in  6 months

## 2021-01-18 NOTE — REASON FOR VISIT
[FreeTextEntry1] : This is a 60 year old male presenting for cardiac evaluation\par His cardiac history includes;\par \par 1. CAD based on Abnl Ca++ scoring 2016\par 2. High Cholesterol\par 3. History of chest pain \par 4. Extensive family hx of premature CAD;  history of HTN, \par \par  [facility]  in 2018 w/ chest  pain.\par \par Had  an abnl regular  EST and a nuclear stress test in July 2019 \par 1.0 mm horizontal ST segment depression.seen on initial EST  and this was seen on his nuclear stress test ECG but SPECT imaging was negative for any evidence  of coronary disease.\par \par

## 2021-01-18 NOTE — PHYSICAL EXAM
[FreeTextEntry1] :                    Well appearing and nourished with no obvious deformities or distress.\par \par Eyes: \par No conjunctival injection and no xanthelasmas.\par HEENT: \par Normocephalic.Normal oral mucosa. No pallor or cyanosis\par Neck: \par No jugular venous distension. with normal A and V wave forms. No palpable adenopathy. Soft Left carotid  bruit. \par Cardiovascular: \par Normal rate and rhythm with normal S1, S2 and a grade 1/6 systolic murmur. Distal arterial pulses are normal. No significant peripheral edema.\par Pulmonary: \par Lungs are clear to auscultation and percussion. Normal respiratory pattern without any accessory muscle use\par Abdomen: \par Soft, non-tender ; no palpable organomegaly or masses.\par Extremities:\par No digital clubbing, cyanosis or ischemic changes.\par Skin: \par No skin lesions, rashes, ulcers or xanthomas.\par Psychiatric: \par Alert and oriented to person, place and time. Appropriate mood and affect.

## 2021-01-18 NOTE — HISTORY OF PRESENT ILLNESS
[FreeTextEntry1] : He continues to be physically active , walks as a form of exercise.  There continues to be no exertional discomfort. \par \par Patient denies any chest pain, shortness of breath, palpitations, PND, orthopnea or edema\par \par Follows a reasonably good diet and has no other active medical issues at this time.\par \par Family hx includes his brother who had a CABG x 4 and more recently carotid sx. \par \par Blood work collected on 1/9/21 will be reviewed

## 2021-02-12 ENCOUNTER — APPOINTMENT (OUTPATIENT)
Dept: CARDIOLOGY | Facility: CLINIC | Age: 61
End: 2021-02-12
Payer: COMMERCIAL

## 2021-02-12 PROCEDURE — 99072 ADDL SUPL MATRL&STAF TM PHE: CPT

## 2021-02-12 PROCEDURE — 93880 EXTRACRANIAL BILAT STUDY: CPT

## 2021-03-01 ENCOUNTER — NON-APPOINTMENT (OUTPATIENT)
Age: 61
End: 2021-03-01

## 2021-07-16 ENCOUNTER — APPOINTMENT (OUTPATIENT)
Dept: CARDIOLOGY | Facility: CLINIC | Age: 61
End: 2021-07-16
Payer: COMMERCIAL

## 2021-07-16 PROCEDURE — A9500: CPT

## 2021-07-16 PROCEDURE — 78452 HT MUSCLE IMAGE SPECT MULT: CPT

## 2021-07-16 PROCEDURE — 93015 CV STRESS TEST SUPVJ I&R: CPT

## 2021-07-16 PROCEDURE — 99072 ADDL SUPL MATRL&STAF TM PHE: CPT

## 2021-08-02 ENCOUNTER — APPOINTMENT (OUTPATIENT)
Dept: CARDIOLOGY | Facility: CLINIC | Age: 61
End: 2021-08-02
Payer: COMMERCIAL

## 2021-08-02 VITALS
OXYGEN SATURATION: 98 % | DIASTOLIC BLOOD PRESSURE: 85 MMHG | BODY MASS INDEX: 27.16 KG/M2 | HEIGHT: 66 IN | SYSTOLIC BLOOD PRESSURE: 125 MMHG | RESPIRATION RATE: 16 BRPM | HEART RATE: 55 BPM | WEIGHT: 169 LBS

## 2021-08-02 DIAGNOSIS — K64.8 OTHER HEMORRHOIDS: ICD-10-CM

## 2021-08-02 PROCEDURE — 93000 ELECTROCARDIOGRAM COMPLETE: CPT

## 2021-08-02 PROCEDURE — 99214 OFFICE O/P EST MOD 30 MIN: CPT

## 2021-08-03 NOTE — HISTORY OF PRESENT ILLNESS
[FreeTextEntry1] : Blood work collected on 6/7/21  reveals a HGB of 12.5  in June of 2020 was 13.5. This was ordered by his PCP and being monitored. \par \par He is s/p colonoscopy which by his report was normal. There is a hx of internal hemorrhoids but denies any bloody stools. \par \par Not having any SOB or increased fatigue. \par \par He continues to be physically active , walks as a form of exercise.  There continues to be no exertional discomfort. \par \par Patient denies any chest pain, shortness of breath, palpitations, PND, orthopnea or edema\par \par Follows a reasonably good diet and has no other active medical issues at this time.\par \par Family hx includes his brother who had a CABG x 4 and more recently carotid sx. \par \par Results of his carotid US and nuclear stress test will be discussed

## 2021-08-03 NOTE — ASSESSMENT
[FreeTextEntry1] : ECG: Sinus sanchez at 55 BPM\par \par Nuclear stress test 7/16/21\par Exercised for 12 minutes and 0 seconds, achieving 13 METS\par Peak  bpm, 104% MAX\par Peak /80\par PVCs during exercise.\par 1.0 mm horizontal ST segment depression in leads 2,3,and aVF, V5 and V6\par ECG positive for ischemia\par SPECT normal \par EF 68%\par \par \par Lab data 6/7/21\par Chol. 124\par HDL 50\par LDL 62\par Tri. 59\par Creat. 1.03\par A1C 5.7\par HGB 12.5\par \par Carotid US 2/12/21\par No evidence of disease.\par \par Lab data 1/18/21\par Chol. 115\par LDL   56\par HDL   48\par Creat. 1.00\par \par 7/19/19 EST\par Exercised for 12 minutes 0 seconds achieving 13 METS\par Peak  bpm ( 94 %)\par Peak /80\par 1.0 mm horizontal ST segment depression, positive for ischemia\par \par 8/16/19 Nuclear stress test\par Peak  BPM\par Peak BP   166/76\par Dev. rare PVCs and PACs\par 2.0 mm horizontal segment depression\par SPECT imaging negative for ischemia\par \par \par Impression:\par 60-year-old male with risk factors of hyperlipidemia and a strong family history of coronary artery disease and a  abnormal calcium score from 2016.\par \par - Stress testing reveals same ECG abnormality ( ST response) seen in 2019 with normal SPECT imaging - negative for ischemia with likely false +. EF and blood pressure response both NL.\par -He is w/o any coronary symptoms\par \par 1 Hyperlipidemia continues to be  adequately controlled and tolerating Lipitor 40 mg.\par \par 2. No active cardiac symptoms at rest or with exertion.\par \par 3.Blood pressure continues to be controlled. \par \par 5. Very soft (L)carotid bruit  on exam today, Feb. 2021 scan w/o evidence of disease. \par \par 6. Mild anemia with hx of hemorrhoids. Recent colonoscopy by his report negative for any acute issues. \par \par Plan:\par 1. Discuss drop in hgb with PCP\par \par 2. Increase exercise regimen and continued diet modifications is encouraged.\par \par 3. Continue to F/U with PCP as scheduled and have all blood work faxed to our office. \par \par 4. Continue statin therapy.\par \par \par Clinical follow up in  6 months

## 2021-08-03 NOTE — REASON FOR VISIT
[FreeTextEntry1] : This is a 60 year old male presenting for cardiac evaluation\par His cardiac history includes;\par \par 1. CAD based on Abnl Ca++ scoring 2016\par 2. High Cholesterol\par 3. History of chest pain \par 4. Extensive family hx of premature CAD;  history of HTN, \par \par  [facility]  in 2018 w/ chest  pain.\par \par Had  an abnl regular  EST and a nuclear stress test in July 2019 \par 1.0 mm horizontal ST segment depression.seen on initial EST  and this was seen on his  recent nuclear stress test 7/16/21  ECG but SPECT imaging was negative for any evidence  of coronary disease.\par \par

## 2021-09-24 RX ORDER — KIT FOR THE PREPARATION OF TECHNETIUM TC99M SESTAMIBI 1 MG/5ML
INJECTION, POWDER, LYOPHILIZED, FOR SOLUTION PARENTERAL
Refills: 0 | Status: COMPLETED | OUTPATIENT
Start: 2021-09-24

## 2021-09-24 RX ADMIN — KIT FOR THE PREPARATION OF TECHNETIUM TC99M SESTAMIBI 0: 1 INJECTION, POWDER, LYOPHILIZED, FOR SOLUTION PARENTERAL at 00:00

## 2022-02-11 ENCOUNTER — APPOINTMENT (OUTPATIENT)
Dept: CARDIOLOGY | Facility: CLINIC | Age: 62
End: 2022-02-11
Payer: COMMERCIAL

## 2022-02-11 ENCOUNTER — NON-APPOINTMENT (OUTPATIENT)
Age: 62
End: 2022-02-11

## 2022-02-11 VITALS
DIASTOLIC BLOOD PRESSURE: 60 MMHG | HEART RATE: 60 BPM | BODY MASS INDEX: 27.32 KG/M2 | WEIGHT: 170 LBS | OXYGEN SATURATION: 98 % | SYSTOLIC BLOOD PRESSURE: 105 MMHG | HEIGHT: 66 IN | RESPIRATION RATE: 16 BRPM

## 2022-02-11 PROCEDURE — 99214 OFFICE O/P EST MOD 30 MIN: CPT

## 2022-02-11 PROCEDURE — 93000 ELECTROCARDIOGRAM COMPLETE: CPT

## 2022-02-11 RX ORDER — MELOXICAM 15 MG/1
15 TABLET ORAL
Refills: 0 | Status: DISCONTINUED | COMMUNITY
Start: 2019-08-30 | End: 2022-02-11

## 2022-02-11 RX ORDER — OFLOXACIN 3 MG/ML
0.3 SOLUTION/ DROPS OPHTHALMIC
Qty: 5 | Refills: 0 | Status: DISCONTINUED | COMMUNITY
Start: 2021-06-20 | End: 2022-02-11

## 2022-02-11 NOTE — HISTORY OF PRESENT ILLNESS
[FreeTextEntry1] : Labs 6/7/21  -  HGB  12.5  \par June of 2020 was 13.5. This was ordered by his PCP and being monitored. \par \par He is s/p colonoscopy which by his report was normal. There is a hx of internal hemorrhoids but denies any bloody stools. \par \par Not having any SOB or increased fatigue. \par \par He continues to be physically active , walks as a form of exercise.  There continues to be no exertional discomfort. \par \par Patient denies any chest pain, shortness of breath, palpitations, PND, orthopnea or edema\par \par Follows a reasonably good diet and has no other active medical issues at this time.\par \par Family hx includes his brother who had a CABG x 4 and more recently had carotid sx. \par \par

## 2022-02-11 NOTE — REASON FOR VISIT
[FreeTextEntry1] : This is a 61  year old male presenting for cardiac evaluation\par His cardiac history includes;\par \par 1. CAD based on Abnl Ca++ scoring 2016\par 2.  Hyperlipidemia\par 3. History of chest pain \par 4. Extensive family hx of premature CAD;  history of HTN, \par \par  [facility]  in 2018 w/ chest  pain.\par \par Had  an abnl regular  EST and a nuclear stress test in July 2019 \par 1.0 mm horizontal ST segment depression.seen on initial EST  and again on his nuclear stress test 7/16/21  but SPECT imaging was negative for any evidence  of coronary disease.\par \par

## 2022-02-11 NOTE — ASSESSMENT
[FreeTextEntry1] : ECG: Normal sinus rhythm at 60 bpm.  Normal axis, intervals, no significant ST-T wave changes\par \par Nuclear stress test 7/16/21\par Exercised for 12 minutes and 0 seconds, achieving 13 METS\par Peak  bpm, 104% MAX\par Peak /80\par PVCs during exercise.\par 1.0 mm horizontal ST segment depression in leads 2,3,and aVF, V5 and V6\par ECG positive for ischemia\par SPECT normal \par EF 68%\par \par \par Lab data \par ------6/7/21\par Chol--124\par HDL---50\par LDL---62\par Trig---59\par Creat--1.03\par A1C--5.7\par HGB 12.5\par \par Carotid US 2/12/21\par No evidence of disease.\par \par Lab data 1/18/21\par Chol. 115\par LDL   56\par HDL   48\par Creat. 1.00\par \par 7/19/19 EST\par Exercised for 12 minutes 0 seconds achieving 13 METS\par Peak  bpm ( 94 %)\par Peak /80\par 1.0 mm horizontal ST segment depression, positive for ischemia\par \par 8/16/19 Nuclear stress test\par Peak  BPM\par Peak BP   166/76\par Dev. rare PVCs and PACs\par 2.0 mm horizontal segment depression\par SPECT imaging negative for ischemia\par \par \par Impression:\par 61-year-old male with risk factors of hyperlipidemia and a strong family history of coronary artery disease and a  abnormal calcium score from 2016.\par \par -Nuclear stress test 7/16/21 reveals similar ECG abnormality ( ST response) as was seen in 2019 with SPECT imaging - negative for ischemia with likely false + ECG.\par LVEF and blood pressure response both NL.\par \par -He is w/o any coronary symptoms\par \par 1 Hyperlipidemia continues to be  adequately controlled and tolerating Lipitor 40 mg.  No recent labs available.\par \par 2. No active cardiac symptoms at rest or with exertion.\par \par 3.Blood pressure continues to be controlled. \par \par 5. Very soft (L)carotid bruit  on exam today, Feb. 2021 scan w/o evidence of disease. \par \par 6. Mild anemia with hx of hemorrhoids. Recent colonoscopy by his report negative for any acute issues.\par     Most recent hemoglobin back at 14.\par \par Plan:\par 1.  Instructed the patient about the benefits of a diet that restricts portion sizes, increases frequency of meals and consists of  vegetables, (more green and leafy),fruit and nuts, whole grains, lean proteins and limits carbohydrates and meat and dairy fats\par \par 2.  The patient was instructed to follow a symptom limited regimen of moderate aerobic exercise for 30 minutes 3 to 4 days a week. A warm up and cool down period are to be added to the regimen and small incremental changes can be made every few weeks. Any new symptoms of exercise related chest pain or dyspnea should be reported.\par \par 3. Continue to F/U with PCP as scheduled and have all blood work faxed to our office. \par \par 4. Continue statin therapy.\par \par \par Clinical follow up in  6 months

## 2022-03-11 ENCOUNTER — LABORATORY RESULT (OUTPATIENT)
Age: 62
End: 2022-03-11

## 2022-03-21 ENCOUNTER — NON-APPOINTMENT (OUTPATIENT)
Age: 62
End: 2022-03-21

## 2022-08-15 ENCOUNTER — APPOINTMENT (OUTPATIENT)
Dept: CARDIOLOGY | Facility: CLINIC | Age: 62
End: 2022-08-15

## 2022-08-15 VITALS
RESPIRATION RATE: 16 BRPM | DIASTOLIC BLOOD PRESSURE: 70 MMHG | HEIGHT: 67 IN | OXYGEN SATURATION: 97 % | WEIGHT: 166 LBS | HEART RATE: 46 BPM | SYSTOLIC BLOOD PRESSURE: 122 MMHG | BODY MASS INDEX: 26.06 KG/M2

## 2022-08-15 PROCEDURE — 93000 ELECTROCARDIOGRAM COMPLETE: CPT

## 2022-08-15 PROCEDURE — 99214 OFFICE O/P EST MOD 30 MIN: CPT | Mod: 25

## 2022-08-15 NOTE — ASSESSMENT
[FreeTextEntry1] : ECG: Sinus bradycardia 46 bpm.  Otherwise within normal limits.\par \par Lab data\par ------1/18/21---6/7/21---3/11/22\par Chol---115------124--------125\par LDL----56--------50---------51\par HDL----48--------62---------63\par Trigs--------------59---------52\par Creat--1.00-----1.03\par W6g---------------6.7---------5.7\par \par Carotid US 2/12/21\par No evidence of disease.\par \par Nuclear stress test 7/16/21\par Exercised for 12 minutes and 0 seconds, achieving 13 METS\par Peak  bpm, 104% MAX\par Peak /80\par PVCs during exercise.\par 1.0 mm horizontal ST segment depression in leads 2,3,and aVF, V5 and V6\par ECG positive for ischemia\par SPECT -no ischemia\par EF 68%\par \par 7/19/19 EST\par Exercised for 12 minutes 0 seconds achieving 13 METS\par Peak  bpm ( 94 %)\par Peak /80\par 1.0 mm horizontal ST segment depression, positive for ischemia\par \par 8/16/19 Nuclear stress test\par Peak  BPM\par Peak BP   166/76\par Dev. rare PVCs and PACs\par 2.0 mm horizontal segment depression\par SPECT imaging negative for ischemia\par \par \par Impression:\par 61-year-old male with risk factors of hyperlipidemia and a strong family history of coronary artery disease and a  abnormal calcium score of 176 (2016)\par \par -Nuclear stress test 7/16/21 reveals similar ECG abnormality ( ST response) as was seen in 2019 with SPECT imaging - negative for ischemia with likely false + ECG.\par LVEF and blood pressure response both NL.\par \par -He is w/o any coronary symptoms exercising avidly on a treadmill\par \par 1 Hyperlipidemia continues to be  adequately controlled and tolerating Lipitor 40 mg.  No recent labs available.\par \par 2. No active cardiac symptoms at rest or with exertion.\par \par 3.Blood pressure continues to be controlled. \par \par 5. Very soft (L)carotid bruit  Feb. 2021 carotid duplex scan w/o evidence of disease. \par \par 6. Mild anemia with hx of hemorrhoids. Recent colonoscopy by his report negative for any acute issues.\par     Most recent hemoglobin back at 14.\par \par Plan:\par 1.  Instructed the patient about the benefits of a diet that restricts portion sizes, increases frequency of meals and consists of  vegetables, (more green and leafy),fruit and nuts, whole grains, lean proteins and limits carbohydrates and meat and dairy fats\par \par 2.  The patient was instructed to follow a symptom limited regimen of moderate aerobic exercise for 30 minutes 3 to 4 days a week. A warm up and cool down period are to be added to the regimen and small incremental changes can be made every few weeks. Any new symptoms of exercise related chest pain or dyspnea should be reported.\par \par 3. Continue to F/U with PCP as scheduled and have all blood work faxed to our office. \par \par 4. Continue statin therapy.\par \par Clinical follow up in  6 months

## 2022-08-15 NOTE — REASON FOR VISIT
[FreeTextEntry1] : This is a 61  year old male presenting for cardiac evaluation\par His cardiac history includes;\par \par 1. CAD based on Abnl Ca++ score 176 7/8/16\par 2.  Hyperlipidemia\par 3. History of chest pain \par 4. Extensive family hx of premature CAD;  history of HTN, \par \par  [facility]  in 2018 w/ chest  pain.\par \par Had  an abnl regular  EST and a nuclear stress test in July 2019 \par 1.0 mm horizontal ST segment depression.seen on initial EST  and again on his nuclear stress test 7/16/21  but SPECT imaging was negative for any evidence  of coronary disease.\par \par He is exercising avidly.\par Does the treadmill without difficulty.\par Denies any symptoms of chest pain, shortness of breath, palpitations, dizziness.

## 2022-08-15 NOTE — HISTORY OF PRESENT ILLNESS
[FreeTextEntry1] : Labs 6/7/21  -  HGB  12.5  \par June of 2020 was 13.5. \par \par He is s/p colonoscopy which by his report was normal. There is a hx of internal hemorrhoids but denies any bloody stools. \par \par Patient denies any chest pain, shortness of breath, palpitations, PND, orthopnea or edema\par \par Follows a reasonably good diet and has no other active medical issues at this time.\par \par Family hx includes his brother who had a CABG x 4 and more recently had carotid sx. \par \par

## 2023-01-30 ENCOUNTER — APPOINTMENT (OUTPATIENT)
Dept: CARDIOLOGY | Facility: CLINIC | Age: 63
End: 2023-01-30
Payer: COMMERCIAL

## 2023-01-30 VITALS
RESPIRATION RATE: 16 BRPM | HEART RATE: 62 BPM | BODY MASS INDEX: 26.68 KG/M2 | DIASTOLIC BLOOD PRESSURE: 70 MMHG | WEIGHT: 170 LBS | OXYGEN SATURATION: 98 % | HEIGHT: 67 IN | SYSTOLIC BLOOD PRESSURE: 118 MMHG

## 2023-01-30 PROCEDURE — 99214 OFFICE O/P EST MOD 30 MIN: CPT | Mod: 25

## 2023-01-30 PROCEDURE — 93000 ELECTROCARDIOGRAM COMPLETE: CPT

## 2023-01-30 NOTE — REASON FOR VISIT
[FreeTextEntry1] : This is a 62   year old male presenting for cardiac evaluation\par His cardiac history includes;\par \par 1. CAD based on Abnl Ca++ score 176 7/8/16\par 2.  Hyperlipidemia\par 3. History of chest pain \par 4. Extensive family hx of premature CAD;  history of HTN, \par \par  [facility]  in 2018 w/ chest  pain.\par \par Had  an abnl regular  EST and a nuclear stress test in July 2019 \par 1.0 mm horizontal ST segment depression.seen on initial EST  and again on his nuclear stress test 7/16/21  but SPECT imaging was negative for any evidence  of coronary disease.\par \par He is exercising avidly.\par Does the treadmill without difficulty.\par Denies any symptoms of chest pain, shortness of breath, palpitations, dizziness.

## 2023-01-30 NOTE — ASSESSMENT
[FreeTextEntry1] : ECG: Normal sinus rhythm at 62.  Otherwise within normal limits.\par \par Lab data\par ------1/18/21---6/7/21---3/11/22--11/21/22\par Chol---115------124--------125------153\par LDL----56--------50---------51--------76\par HDL----48--------62---------63--------62\par Trigs--------------59---------52-------59\par Creat--1.00-----1.03\par P9b---------------3.7---------5.7\par \par Carotid US 2/12/21\par No evidence of disease.\par \par Nuclear stress test 7/16/21\par Exercised for 12 minutes and 0 seconds, achieving 13 METS\par Peak  bpm, 104% MAX\par Peak /80\par PVCs during exercise.\par 1.0 mm horizontal ST segment depression in leads 2,3,and aVF, V5 and V6\par ECG positive for ischemia\par SPECT -no ischemia\par EF 68%\par \par 7/19/19 EST\par Exercised for 12 minutes 0 seconds achieving 13 METS\par Peak  bpm ( 94 %)\par Peak /80\par 1.0 mm horizontal ST segment depression, positive for ischemia\par \par 8/16/19 Nuclear stress test\par Peak  BPM\par Peak BP   166/76\par Dev. rare PVCs and PACs\par 2.0 mm horizontal segment depression\par SPECT imaging negative for ischemia\par \par \par Impression:\par 62-year-old male with risk factors of hyperlipidemia and a very strong family history of coronary artery disease and a  abnormal calcium score of 176 (2016) and a known false positive regular stress ECG.\par \par -Nuclear stress test 7/16/21 reveals similar ECG abnormality ( ST response) as was seen in 2019 with SPECT imaging - negative for ischemia with likely false + ECG.\par LVEF and blood pressure response both NL.\par \par -While he remains w/o any coronary symptoms exercising avidly on a treadmill, this is with known LAD plaque burden and the aforementioned very strong risk factors of premature CAD in father and brother.\par \par 1 Hyperlipidemia continues to be  adequately controlled and tolerating Lipitor 40 mg.  No recent labs available.\par \par 2. No active cardiac symptoms at rest or with exertion.\par \par 3.Blood pressure continues to be controlled. \par \par 5. Very soft (L)carotid bruit  Feb. 2021 carotid duplex scan w/o evidence of disease. \par \par 6. Mild anemia with hx of hemorrhoids. Recent colonoscopy by his report negative for any acute issues.\par     Most recent hemoglobin back at 14.\par \par Plan:\par 1.  Instructed the patient about the benefits of a diet that restricts portion sizes, increases frequency of meals and consists of  vegetables, (more green and leafy),fruit and nuts, whole grains, lean proteins and limits carbohydrates and meat and dairy fats\par \par 2.  The patient was instructed to follow a symptom limited regimen of moderate aerobic exercise for 30 minutes 3 to 4 days a week. A warm up and cool down period are to be added to the regimen and small incremental changes can be made every few weeks. Any new symptoms of exercise related chest pain or dyspnea should be reported.\par \par 3. Continue to F/U with PCP as scheduled and have all blood work faxed to our office. \par \par 4. Continue statin therapy.\par \par 5.  In view of the patient's very strong family history of clinically significant premature CAD, his own elevated calcium score particularly in the LAD territory, his known a false positive regular stress ECG which renders that test fairly inconclusive, the patient is referred for an exercise sestamibi stress test to reassess his ischemic potential burden and tolerance and compare with prior testing.\par

## 2023-03-10 ENCOUNTER — APPOINTMENT (OUTPATIENT)
Dept: CARDIOLOGY | Facility: CLINIC | Age: 63
End: 2023-03-10
Payer: COMMERCIAL

## 2023-03-10 PROCEDURE — 93015 CV STRESS TEST SUPVJ I&R: CPT

## 2023-03-10 PROCEDURE — A9500: CPT

## 2023-03-10 PROCEDURE — 78452 HT MUSCLE IMAGE SPECT MULT: CPT

## 2023-03-24 ENCOUNTER — APPOINTMENT (OUTPATIENT)
Dept: CARDIOLOGY | Facility: CLINIC | Age: 63
End: 2023-03-24
Payer: COMMERCIAL

## 2023-03-24 VITALS
BODY MASS INDEX: 26.68 KG/M2 | WEIGHT: 170 LBS | DIASTOLIC BLOOD PRESSURE: 60 MMHG | SYSTOLIC BLOOD PRESSURE: 106 MMHG | RESPIRATION RATE: 16 BRPM | HEIGHT: 67 IN | OXYGEN SATURATION: 97 % | HEART RATE: 65 BPM

## 2023-03-24 PROCEDURE — 99214 OFFICE O/P EST MOD 30 MIN: CPT

## 2023-03-24 NOTE — REASON FOR VISIT
[FreeTextEntry1] : This is a 62   year old male presenting for cardiac evaluation\par His cardiac history includes;\par \par 1. CAD based on Abnl Ca++ score 176    7/8/16\par 2.  Hyperlipidemia\par 3. History of chest pain \par 4. Extensive family hx of premature CAD;  history of HTN, \par \par  [facility]  in 2018 w/ chest  pain.\par \par Had  an abnl regular  EST and a nuclear stress test in July 2019 \par 1.0 mm horizontal ST segment depression.seen on initial EST  and again on his nuclear stress test 7/16/21  but SPECT imaging was negative for any evidence  of coronary disease.\par \par He is exercising avidly.\par Does the treadmill without difficulty.\par Denies any symptoms of chest pain, shortness of breath, palpitations, dizziness.

## 2023-03-24 NOTE — HISTORY OF PRESENT ILLNESS
[FreeTextEntry1] : He is s/p colonoscopy which by his report was normal. There is a hx of internal hemorrhoids but denies any bloody stools. \par \par Patient denies any chest pain, shortness of breath, palpitations, PND, orthopnea or edema\par \par Follows a reasonably good diet and has no other active medical issues at this time.\par \par Family hx includes his brother who had a CABG x 4 and more recently had carotid sx. \par \par

## 2023-03-24 NOTE — ASSESSMENT
[FreeTextEntry1] : \par Lab data\par ------21---21---3/11/22--22\par Chol---115------124--------125------153\par LDL----56--------50---------51--------76\par HDL----48--------62---------63--------62\par Trigs--------------59---------52-------59\par Creat--1.00-----1.03\par G7h---------------8.7---------5.7\par \par Carotid US 21\par No evidence of disease.\par \par Stress MIBI 3/10/2023:\par Time: 9 minutes 45 seconds (11 METS)\par Heart rate: 141 (91%)\par Blood pressure: Normal response 154/70\par EC mm downsloping ST depressions consistent with ischemia.\par SPECT: No evidence of ischemia\par Duke score 5 low risk\par \par Nuclear stress test 21\par Exercised for 12 minutes and 0 seconds, achieving 13 METS\par Peak  bpm, 104% MAX\par Peak /80\par PVCs during exercise.\par 1.0 mm horizontal ST segment depression in leads 2,3,and aVF, V5 and V6\par ECG positive for ischemia\par SPECT -no ischemia\par EF 68%\par \par 19 EST\par Exercised for 12 minutes 0 seconds achieving 13 METS\par Peak  bpm ( 94 %)\par Peak /80\par 1.0 mm horizontal ST segment depression, positive for ischemia\par \par 19 Nuclear stress test\par Peak  BPM\par Peak BP   166/76\par Dev. rare PVCs and PACs\par 2.0 mm horizontal segment depression\par SPECT imaging negative for ischemia\par \par \par Impression:\par 62-year-old male with risk factors of hyperlipidemia and a very strong family history of coronary artery disease and a  abnormal calcium score of 176 (2016) and a known false positive regular stress ECG.\par \par -Current nuclear stress testing again demonstrating ST segment depression but absence of SPECT ischemia.\par -Nuclear stress test 21 reveals similar ECG abnormality ( ST response) as was seen in 2019 with SPECT imaging - negative for ischemia with likely false + ECG.\par LVEF and blood pressure response both NL.\par \par -He remains w/o any coronary symptoms, exercising avidly on a treadmill,  with known LAD plaque burden and the aforementioned very strong risk factors of premature CAD in father and brother.\par \par 1 Hyperlipidemia continues to be  adequately controlled and tolerating Lipitor 40 mg.  No recent labs available.\par \par 2. No active cardiac symptoms at rest or with exertion.\par \par 3.Blood pressure continues to be controlled. \par \par 5. Very soft (L)carotid bruit  Feb.  carotid duplex scan w/o evidence of disease. \par \par 6. Mild anemia with hx of hemorrhoids. Recent colonoscopy by his report negative for any acute issues.\par     Most recent hemoglobin back at 14.\par \par Plan:\par 1.  Instructed the patient about the benefits of a diet that restricts portion sizes, increases frequency of meals and consists of  vegetables, (more green and leafy),fruit and nuts, whole grains, lean proteins and limits carbohydrates and meat and dairy fats\par \par 2.  The patient was instructed to follow a symptom limited regimen of moderate aerobic exercise for 30 minutes 3 to 4 days a week. A warm up and cool down period are to be added to the regimen and small incremental changes can be made every few weeks. Any new symptoms of exercise related chest pain or dyspnea should be reported.\par \par 3. Continue to F/U with PCP as scheduled and have all blood work faxed to our office. \par \par 4. Continue statin therapy.  Repeat labs ordered\par \par 5.  Patient knows to contact me with regard to any new exertional complaints.

## 2023-04-06 RX ORDER — KIT FOR THE PREPARATION OF TECHNETIUM TC99M SESTAMIBI 1 MG/5ML
INJECTION, POWDER, LYOPHILIZED, FOR SOLUTION PARENTERAL
Refills: 0 | Status: COMPLETED | OUTPATIENT
Start: 2023-04-06

## 2023-04-06 RX ADMIN — KIT FOR THE PREPARATION OF TECHNETIUM TC99M SESTAMIBI 0: 1 INJECTION, POWDER, LYOPHILIZED, FOR SOLUTION PARENTERAL at 00:00

## 2023-04-21 ENCOUNTER — LABORATORY RESULT (OUTPATIENT)
Age: 63
End: 2023-04-21

## 2023-04-24 ENCOUNTER — NON-APPOINTMENT (OUTPATIENT)
Age: 63
End: 2023-04-24

## 2024-02-08 ENCOUNTER — APPOINTMENT (OUTPATIENT)
Dept: CARDIOLOGY | Facility: CLINIC | Age: 64
End: 2024-02-08
Payer: COMMERCIAL

## 2024-02-08 VITALS
WEIGHT: 175 LBS | HEIGHT: 67 IN | DIASTOLIC BLOOD PRESSURE: 80 MMHG | BODY MASS INDEX: 27.47 KG/M2 | SYSTOLIC BLOOD PRESSURE: 122 MMHG | HEART RATE: 57 BPM | OXYGEN SATURATION: 99 % | RESPIRATION RATE: 16 BRPM

## 2024-02-08 DIAGNOSIS — E78.00 PURE HYPERCHOLESTEROLEMIA, UNSPECIFIED: ICD-10-CM

## 2024-02-08 DIAGNOSIS — I25.10 ATHEROSCLEROTIC HEART DISEASE OF NATIVE CORONARY ARTERY W/OUT ANGINA PECTORIS: ICD-10-CM

## 2024-02-08 DIAGNOSIS — R93.1 ABNORMAL FINDINGS ON DIAGNOSTIC IMAGING OF HEART AND CORONARY CIRCULATION: ICD-10-CM

## 2024-02-08 DIAGNOSIS — R73.09 OTHER ABNORMAL GLUCOSE: ICD-10-CM

## 2024-02-08 PROCEDURE — 99214 OFFICE O/P EST MOD 30 MIN: CPT

## 2024-02-08 PROCEDURE — 93000 ELECTROCARDIOGRAM COMPLETE: CPT

## 2024-02-08 PROCEDURE — G2211 COMPLEX E/M VISIT ADD ON: CPT | Mod: NC,1L

## 2024-02-08 RX ORDER — MELOXICAM 15 MG/1
15 TABLET ORAL
Refills: 1 | Status: DISCONTINUED | COMMUNITY
End: 2024-02-08

## 2024-02-08 NOTE — ASSESSMENT
[FreeTextEntry1] : Lab data ------21---21---3/11/22--22----12/15/23 Chol---115------124--------125------153-------------159 LDL----56--------50---------51--------76---------------62 HDL----48--------62---------63--------62--------------80 Trigs--------------59---------52---------59--------------73- Creat--1.00-----1.03 K2g---------------4.7---------5.7  Carotid US 21 No evidence of disease.  Stress MIBI 3/10/2023: Time: 9 minutes 45 seconds (11 METS) Heart rate: 141 (91%) Blood pressure: Normal response 154/70 EC mm downsloping ST depressions consistent with ischemia. SPECT: No evidence of ischemia Duke score 5 low risk  Nuclear stress test 21 Exercised for 12 minutes and 0 seconds, achieving 13 METS Peak  bpm, 104% MAX Peak /80 PVCs during exercise. 1.0 mm horizontal ST segment depression in leads 2,3,and aVF, V5 and V6 ECG positive for ischemia SPECT -no ischemia EF 68%  19 EST Exercised for 12 minutes 0 seconds achieving 13 METS Peak  bpm ( 94 %) Peak /80 1.0 mm horizontal ST segment depression, positive for ischemia  19 Nuclear stress test Peak  BPM Peak BP   166/76 Dev. rare PVCs and PACs 2.0 mm horizontal segment depression SPECT imaging negative for ischemia   Impression: 63-year-old male with risk factors of hyperlipidemia and a very strong family history of coronary artery disease and an abnormal calcium score of 176 (2016) and a known false positive regular stress ECG.  - nuclear stress testing again demonstrating ST segment depression but absence of SPECT ischemia. -Nuclear stress test 21 reveals similar ECG abnormality ( ST response) as was seen in 2019 with SPECT imaging - negative for ischemia with likely false + ECG. LVEF and blood pressure response both NL.  -He remains w/o any coronary symptoms, though exercising far less.  Known LAD plaque burden and the aforementioned very strong risk factors of premature CAD in father and brother.  1. Hyperlipidemia: Noted a slight increase in the LDL previously in the 50-60 range now 80.   Patient insist that there has been no change in his dietary habits.  Does acknowledge the changes and exercise..  2. No active cardiac symptoms at rest or with exertion.  3.Blood pressure continues to be controlled.   5. Very soft (L)carotid bruit  2021 carotid duplex scan w/o evidence of disease.   6. Mild anemia with hx of hemorrhoids. Recent colonoscopy by his report negative for any acute issues. Most recent hemoglobin back at 14.  Plan: 1.  Instructed the patient about the benefits of a diet that restricts portion sizes, increases frequency of meals and consists of  vegetables, (more green and leafy),fruit and nuts, whole grains, lean proteins and limits carbohydrates and meat and dairy fats.  2.  The patient was instructed to follow a symptom limited regimen of moderate aerobic exercise for 30 minutes 3 to 4 days a week. A warm up and cool down period are to be added to the regimen and small incremental changes can be made every few weeks. Any new symptoms of exercise related chest pain or dyspnea should be reported.  3. Continue to F/U with PCP as scheduled and have all blood work faxed to our office.   4. Continue statin therapy.  Repeat labs ordered.  5.  Patient knows to contact me with regard to any new exertional complaints.

## 2024-02-08 NOTE — REVIEW OF SYSTEMS
* Please call as soon as possible Thursday a.m.  Lab opens at 7:00 a.m. (They are aware provider and nursing staff are out of the office this afternoon.)    Fina from [facility] called.  Patient is coming in tomorrow for some lab work for a [facility].  Patient is a \"hard stick\" and they are wondering if it would be okay to draw Dr. Tovar's labs as well tomorrow, even though it would be a smidge sooner than originally due?    Please call [facility] at [phone] as soon as possible Thursday morning as patient is coming in at approximately 8:15 a.m.    Thank you.   [Weight Gain (___ Lbs)] : no recent weight gain [Weight Loss (___ Lbs)] : no recent weight loss

## 2024-02-08 NOTE — REASON FOR VISIT
[FreeTextEntry1] : This is a 63   year old male presenting for cardiac evaluation His cardiac history includes;  1. CAD based on Abnl Ca++ score 176   ( 7/8/16) 2.  Hyperlipidemia 3. History of chest pain  4. Extensive family hx of premature CAD;  history of HTN,    [facility]  in 2018 w/ chest  pain.  Had  an abnl regular  EST and a nuclear stress test in July 2019  1.0 mm horizontal ST segment depression.seen on initial EST  and again on his nuclear stress test 7/16/21  but SPECT imaging was negative for any evidence  of coronary disease.  Exercising far less than he had in the past.  Down to about two 20-minute sessions of just moderate exercise per week. Denies any symptoms of chest pain, shortness of breath, palpitations, dizziness.

## 2024-04-22 RX ORDER — ATORVASTATIN CALCIUM 40 MG/1
40 TABLET, FILM COATED ORAL
Qty: 90 | Refills: 1 | Status: ACTIVE | COMMUNITY
Start: 1900-01-01 | End: 1900-01-01

## 2024-08-19 ENCOUNTER — APPOINTMENT (OUTPATIENT)
Dept: CARDIOLOGY | Facility: CLINIC | Age: 64
End: 2024-08-19
Payer: COMMERCIAL

## 2024-08-19 VITALS
SYSTOLIC BLOOD PRESSURE: 134 MMHG | RESPIRATION RATE: 12 BRPM | DIASTOLIC BLOOD PRESSURE: 76 MMHG | BODY MASS INDEX: 25.9 KG/M2 | HEART RATE: 62 BPM | HEIGHT: 67 IN | WEIGHT: 165 LBS

## 2024-08-19 DIAGNOSIS — E78.00 PURE HYPERCHOLESTEROLEMIA, UNSPECIFIED: ICD-10-CM

## 2024-08-19 DIAGNOSIS — R93.1 ABNORMAL FINDINGS ON DIAGNOSTIC IMAGING OF HEART AND CORONARY CIRCULATION: ICD-10-CM

## 2024-08-19 DIAGNOSIS — R73.09 OTHER ABNORMAL GLUCOSE: ICD-10-CM

## 2024-08-19 DIAGNOSIS — I25.10 ATHEROSCLEROTIC HEART DISEASE OF NATIVE CORONARY ARTERY W/OUT ANGINA PECTORIS: ICD-10-CM

## 2024-08-19 PROCEDURE — G2211 COMPLEX E/M VISIT ADD ON: CPT | Mod: NC

## 2024-08-19 PROCEDURE — 99214 OFFICE O/P EST MOD 30 MIN: CPT

## 2024-08-19 PROCEDURE — 93000 ELECTROCARDIOGRAM COMPLETE: CPT

## 2024-08-19 NOTE — ASSESSMENT
[FreeTextEntry1] : ECG: Sinus arrhythmia at 62 with normal axis intervals no significant ST-T wave changes.  Lab data ------21---21---3/11/22--22----12/15/23--2624 Chol---115------124--------125------153-------------159------133 LDL----56--------50---------51--------76---------------62--------50 HDL----48--------62---------63--------62--------------80--------70 Trigs--------------59---------52---------59--------------73--------57 Creat--1.00-----1.03 A1s---------------7.7---------5.7  Carotid US 21 No evidence of disease.  Stress MIBI 3/10/2023: Time: 9 minutes 45 seconds (11 METS) Heart rate: 141 (91%) Blood pressure: Normal response 154/70 EC mm downsloping ST depressions consistent with ischemia. SPECT: No evidence of ischemia Duke score 5 low risk  Nuclear stress test 21 Exercised for 12 minutes and 0 seconds, achieving 13 METS Peak  bpm, 104% MAX Peak /80 PVCs during exercise. 1.0 mm horizontal ST segment depression in leads 2,3,and aVF, V5 and V6 ECG positive for ischemia SPECT -no ischemia EF 68%  19 EST Exercised for 12 minutes 0 seconds achieving 13 METS Peak  bpm ( 94 %) Peak /80 1.0 mm horizontal ST segment depression, positive for ischemia  19 Nuclear stress test Peak  BPM Peak BP   166/76 Dev. rare PVCs and PACs 2.0 mm horizontal segment depression SPECT imaging negative for ischemia   Impression: 63-year-old male with risk factors of hyperlipidemia and a very strong family history of coronary artery disease and an abnormal calcium score of 176 () and a known false positive regular stress ECG.  - nuclear stress testing again demonstrating ST segment depression but absence of SPECT ischemia. -Nuclear stress test 21 reveals similar ECG abnormality ( ST response) as was seen in 2019 with SPECT imaging - negative for ischemia with likely false + ECG. LVEF and blood pressure response both NL.  -He remains w/o any coronary symptoms. Known LAD plaque burden and the aforementioned very strong risk factors of premature CAD in father and brother.  1. Hyperlipidemia: Reasonably well-controlled with LDL back at 70.  2. No active cardiac symptoms at rest or with exertion.  3.Blood pressure continues to be controlled.   5. Very soft (L)carotid bruit  Feb.  carotid duplex scan w/o evidence of disease.   6. Mild anemia with hx of hemorrhoids. Recent colonoscopy by his report negative for any acute issues. Most recent hemoglobin back at 14.  Plan: 1.  Instructed the patient about the benefits of a diet that restricts portion sizes, increases frequency of meals and consists of  vegetables, (more green and leafy),fruit and nuts, whole grains, lean proteins and limits carbohydrates and meat and dairy fats.  2.  The patient was instructed to follow a symptom limited regimen of moderate aerobic exercise for 30 minutes 3 to 4 days a week. A warm up and cool down period are to be added to the regimen and small incremental changes can be made every few weeks. Any new symptoms of exercise related chest pain or dyspnea should be reported.  3. Continue to F/U with PCP as scheduled and have all blood work faxed to our office.   4. Continue statin therapy.  Repeat labs ordered in 6 months  5.  Considering a repeat stress test next spring.

## 2024-08-19 NOTE — ASSESSMENT
[FreeTextEntry1] : ECG: Sinus arrhythmia at 62 with normal axis intervals no significant ST-T wave changes.  Lab data ------21---21---3/11/22--22----12/15/23--2624 Chol---115------124--------125------153-------------159------133 LDL----56--------50---------51--------76---------------62--------50 HDL----48--------62---------63--------62--------------80--------70 Trigs--------------59---------52---------59--------------73--------57 Creat--1.00-----1.03 C0i---------------5.7---------5.7  Carotid US 21 No evidence of disease.  Stress MIBI 3/10/2023: Time: 9 minutes 45 seconds (11 METS) Heart rate: 141 (91%) Blood pressure: Normal response 154/70 EC mm downsloping ST depressions consistent with ischemia. SPECT: No evidence of ischemia Duke score 5 low risk  Nuclear stress test 21 Exercised for 12 minutes and 0 seconds, achieving 13 METS Peak  bpm, 104% MAX Peak /80 PVCs during exercise. 1.0 mm horizontal ST segment depression in leads 2,3,and aVF, V5 and V6 ECG positive for ischemia SPECT -no ischemia EF 68%  19 EST Exercised for 12 minutes 0 seconds achieving 13 METS Peak  bpm ( 94 %) Peak /80 1.0 mm horizontal ST segment depression, positive for ischemia  19 Nuclear stress test Peak  BPM Peak BP   166/76 Dev. rare PVCs and PACs 2.0 mm horizontal segment depression SPECT imaging negative for ischemia   Impression: 63-year-old male with risk factors of hyperlipidemia and a very strong family history of coronary artery disease and an abnormal calcium score of 176 () and a known false positive regular stress ECG.  - nuclear stress testing again demonstrating ST segment depression but absence of SPECT ischemia. -Nuclear stress test 21 reveals similar ECG abnormality ( ST response) as was seen in 2019 with SPECT imaging - negative for ischemia with likely false + ECG. LVEF and blood pressure response both NL.  -He remains w/o any coronary symptoms. Known LAD plaque burden and the aforementioned very strong risk factors of premature CAD in father and brother.  1. Hyperlipidemia: Reasonably well-controlled with LDL back at 70.  2. No active cardiac symptoms at rest or with exertion.  3.Blood pressure continues to be controlled.   5. Very soft (L)carotid bruit  Feb.  carotid duplex scan w/o evidence of disease.   6. Mild anemia with hx of hemorrhoids. Recent colonoscopy by his report negative for any acute issues. Most recent hemoglobin back at 14.  Plan: 1.  Instructed the patient about the benefits of a diet that restricts portion sizes, increases frequency of meals and consists of  vegetables, (more green and leafy),fruit and nuts, whole grains, lean proteins and limits carbohydrates and meat and dairy fats.  2.  The patient was instructed to follow a symptom limited regimen of moderate aerobic exercise for 30 minutes 3 to 4 days a week. A warm up and cool down period are to be added to the regimen and small incremental changes can be made every few weeks. Any new symptoms of exercise related chest pain or dyspnea should be reported.  3. Continue to F/U with PCP as scheduled and have all blood work faxed to our office.   4. Continue statin therapy.  Repeat labs ordered in 6 months  5.  Considering a repeat stress test next spring.

## 2024-08-19 NOTE — REASON FOR VISIT
[FreeTextEntry1] : This is a 63   year old male presenting for cardiac evaluation His cardiac history includes;  1. ASHD based on Abnl Ca++ score 176   ( 7/8/16) 2.  Hyperlipidemia 3. History of chest pain  4. Extensive family hx of premature CAD;  history of HTN,    [facility]  in 2018 w/ chest  pain.  Had  an abnl regular  EST and a nuclear stress test in July 2019  1.0 mm horizontal ST segment depression seen on initial EST  and again on his nuclear stress test 7/16/21  but SPECT imaging was negative for any evidence  of coronary disease.  He is back to exercising more rigorously.  Believes that this has helped him with some weight loss and control of his lipids. Denies any symptoms of chest pain, shortness of breath, palpitations, dizziness.

## 2024-08-19 NOTE — REASON FOR VISIT
[FreeTextEntry1] : This is a 63   year old male presenting for cardiac evaluation His cardiac history includes;  1. ASHD based on Abnl Ca++ score 176   ( 7/8/16) 2.  Hyperlipidemia 3. History of chest pain  4. Extensive family hx of premature CAD;  history of HTN,    [facility]  in 2018 w/ chest  pain.  Had  an abnl regular  EST and a nuclear stress test in July 2019  1.0 mm horizontal ST segment depression seen on initial EST  and again on his nuclear stress test 7/16/21  but SPECT imaging was negative for any evidence  of coronary disease.  He is back to exercising more rigorously.  Believes that this has helped him with some weight loss and control of his lipids. Denies any symptoms of chest pain, shortness of breath, palpitations, dizziness.

## 2024-11-12 ENCOUNTER — NON-APPOINTMENT (OUTPATIENT)
Age: 64
End: 2024-11-12

## 2025-02-20 ENCOUNTER — APPOINTMENT (OUTPATIENT)
Dept: CARDIOLOGY | Facility: CLINIC | Age: 65
End: 2025-02-20
Payer: COMMERCIAL

## 2025-02-20 VITALS
DIASTOLIC BLOOD PRESSURE: 70 MMHG | BODY MASS INDEX: 26.06 KG/M2 | HEIGHT: 67 IN | RESPIRATION RATE: 16 BRPM | SYSTOLIC BLOOD PRESSURE: 122 MMHG | HEART RATE: 62 BPM | OXYGEN SATURATION: 98 % | WEIGHT: 166 LBS

## 2025-02-20 DIAGNOSIS — E78.00 PURE HYPERCHOLESTEROLEMIA, UNSPECIFIED: ICD-10-CM

## 2025-02-20 DIAGNOSIS — R93.1 ABNORMAL FINDINGS ON DIAGNOSTIC IMAGING OF HEART AND CORONARY CIRCULATION: ICD-10-CM

## 2025-02-20 DIAGNOSIS — I25.10 ATHEROSCLEROTIC HEART DISEASE OF NATIVE CORONARY ARTERY W/OUT ANGINA PECTORIS: ICD-10-CM

## 2025-02-20 DIAGNOSIS — R73.09 OTHER ABNORMAL GLUCOSE: ICD-10-CM

## 2025-02-20 DIAGNOSIS — R94.31 ABNORMAL ELECTROCARDIOGRAM [ECG] [EKG]: ICD-10-CM

## 2025-02-20 PROCEDURE — 93000 ELECTROCARDIOGRAM COMPLETE: CPT

## 2025-02-20 PROCEDURE — 99214 OFFICE O/P EST MOD 30 MIN: CPT

## 2025-02-20 PROCEDURE — G2211 COMPLEX E/M VISIT ADD ON: CPT | Mod: NC

## 2025-04-22 ENCOUNTER — NON-APPOINTMENT (OUTPATIENT)
Age: 65
End: 2025-04-22

## 2025-04-29 ENCOUNTER — APPOINTMENT (OUTPATIENT)
Dept: GASTROENTEROLOGY | Facility: CLINIC | Age: 65
End: 2025-04-29
Payer: COMMERCIAL

## 2025-04-29 VITALS
HEIGHT: 67 IN | TEMPERATURE: 97.5 F | BODY MASS INDEX: 25.43 KG/M2 | SYSTOLIC BLOOD PRESSURE: 127 MMHG | HEART RATE: 63 BPM | DIASTOLIC BLOOD PRESSURE: 72 MMHG | RESPIRATION RATE: 14 BRPM | OXYGEN SATURATION: 98 % | WEIGHT: 162 LBS

## 2025-04-29 DIAGNOSIS — Z71.9 COUNSELING, UNSPECIFIED: ICD-10-CM

## 2025-04-29 DIAGNOSIS — Z83.3 FAMILY HISTORY OF DIABETES MELLITUS: ICD-10-CM

## 2025-04-29 DIAGNOSIS — Z86.39 PERSONAL HISTORY OF OTHER ENDOCRINE, NUTRITIONAL AND METABOLIC DISEASE: ICD-10-CM

## 2025-04-29 DIAGNOSIS — Z87.442 PERSONAL HISTORY OF URINARY CALCULI: ICD-10-CM

## 2025-04-29 DIAGNOSIS — Z87.39 PERSONAL HISTORY OF OTHER DISEASES OF THE MUSCULOSKELETAL SYSTEM AND CONNECTIVE TISSUE: ICD-10-CM

## 2025-04-29 PROCEDURE — 99203 OFFICE O/P NEW LOW 30 MIN: CPT

## 2025-05-15 ENCOUNTER — NON-APPOINTMENT (OUTPATIENT)
Age: 65
End: 2025-05-15

## 2025-05-27 ENCOUNTER — NON-APPOINTMENT (OUTPATIENT)
Age: 65
End: 2025-05-27

## 2025-05-29 ENCOUNTER — APPOINTMENT (OUTPATIENT)
Dept: CARDIOLOGY | Facility: CLINIC | Age: 65
End: 2025-05-29
Payer: COMMERCIAL

## 2025-05-29 PROCEDURE — 93351 STRESS TTE COMPLETE: CPT

## 2025-05-29 RX ORDER — PERFLUTREN 6.52 MG/ML
6.52 INJECTION, SUSPENSION INTRAVENOUS
Qty: 4 | Refills: 0 | Status: COMPLETED | OUTPATIENT
Start: 2025-05-29

## 2025-06-19 ENCOUNTER — APPOINTMENT (OUTPATIENT)
Dept: CARDIOLOGY | Facility: CLINIC | Age: 65
End: 2025-06-19
Payer: COMMERCIAL

## 2025-06-19 VITALS
RESPIRATION RATE: 13 BRPM | WEIGHT: 160 LBS | SYSTOLIC BLOOD PRESSURE: 124 MMHG | DIASTOLIC BLOOD PRESSURE: 68 MMHG | BODY MASS INDEX: 25.06 KG/M2 | HEART RATE: 64 BPM | OXYGEN SATURATION: 94 %

## 2025-06-19 PROCEDURE — 99214 OFFICE O/P EST MOD 30 MIN: CPT

## 2025-06-19 PROCEDURE — 93000 ELECTROCARDIOGRAM COMPLETE: CPT

## 2025-06-19 PROCEDURE — G2211 COMPLEX E/M VISIT ADD ON: CPT | Mod: NC

## 2025-08-16 ENCOUNTER — EMERGENCY (EMERGENCY)
Facility: HOSPITAL | Age: 65
LOS: 1 days | End: 2025-08-16
Attending: EMERGENCY MEDICINE
Payer: COMMERCIAL

## 2025-08-16 VITALS
SYSTOLIC BLOOD PRESSURE: 147 MMHG | TEMPERATURE: 98 F | OXYGEN SATURATION: 99 % | WEIGHT: 160.06 LBS | DIASTOLIC BLOOD PRESSURE: 85 MMHG | RESPIRATION RATE: 18 BRPM | HEIGHT: 67 IN | HEART RATE: 69 BPM

## 2025-08-16 PROCEDURE — 99283 EMERGENCY DEPT VISIT LOW MDM: CPT

## 2025-08-16 PROCEDURE — 99284 EMERGENCY DEPT VISIT MOD MDM: CPT

## 2025-08-16 RX ORDER — METHOCARBAMOL 500 MG/1
1500 TABLET, FILM COATED ORAL ONCE
Refills: 0 | Status: COMPLETED | OUTPATIENT
Start: 2025-08-16 | End: 2025-08-16

## 2025-08-16 RX ORDER — LIDOCAINE HYDROCHLORIDE 20 MG/ML
1 JELLY TOPICAL ONCE
Refills: 0 | Status: COMPLETED | OUTPATIENT
Start: 2025-08-16 | End: 2025-08-16

## 2025-08-16 RX ORDER — LIDOCAINE HYDROCHLORIDE 20 MG/ML
1 JELLY TOPICAL
Qty: 1 | Refills: 0
Start: 2025-08-16

## 2025-08-16 RX ORDER — IBUPROFEN 200 MG
400 TABLET ORAL ONCE
Refills: 0 | Status: COMPLETED | OUTPATIENT
Start: 2025-08-16 | End: 2025-08-16

## 2025-08-16 RX ORDER — METHOCARBAMOL 500 MG/1
2 TABLET, FILM COATED ORAL
Qty: 30 | Refills: 0
Start: 2025-08-16 | End: 2025-08-20

## 2025-08-16 RX ADMIN — LIDOCAINE HYDROCHLORIDE 1 PATCH: 20 JELLY TOPICAL at 15:40

## 2025-08-16 RX ADMIN — METHOCARBAMOL 1500 MILLIGRAM(S): 500 TABLET, FILM COATED ORAL at 15:40

## 2025-08-16 RX ADMIN — Medication 400 MILLIGRAM(S): at 15:40
